# Patient Record
Sex: FEMALE | Race: OTHER | NOT HISPANIC OR LATINO
[De-identification: names, ages, dates, MRNs, and addresses within clinical notes are randomized per-mention and may not be internally consistent; named-entity substitution may affect disease eponyms.]

---

## 2022-08-16 ENCOUNTER — NON-APPOINTMENT (OUTPATIENT)
Age: 27
End: 2022-08-16

## 2023-04-17 ENCOUNTER — EMERGENCY (EMERGENCY)
Facility: HOSPITAL | Age: 28
LOS: 1 days | Discharge: HOME | End: 2023-04-17
Attending: EMERGENCY MEDICINE
Payer: COMMERCIAL

## 2023-04-17 VITALS
RESPIRATION RATE: 18 BRPM | OXYGEN SATURATION: 98 % | DIASTOLIC BLOOD PRESSURE: 89 MMHG | TEMPERATURE: 98 F | SYSTOLIC BLOOD PRESSURE: 138 MMHG | HEART RATE: 71 BPM

## 2023-04-17 VITALS
WEIGHT: 184.97 LBS | HEIGHT: 68 IN | TEMPERATURE: 98 F | HEART RATE: 66 BPM | RESPIRATION RATE: 20 BRPM | OXYGEN SATURATION: 98 % | DIASTOLIC BLOOD PRESSURE: 93 MMHG | SYSTOLIC BLOOD PRESSURE: 134 MMHG

## 2023-04-17 LAB
ALBUMIN SERPL ELPH-MCNC: 3.3 G/DL — SIGNIFICANT CHANGE UP (ref 3.3–5)
ALBUMIN SERPL ELPH-MCNC: 4.7 G/DL — SIGNIFICANT CHANGE UP (ref 3.3–5)
ALP SERPL-CCNC: 17 U/L — LOW (ref 40–120)
ALP SERPL-CCNC: <15 U/L — LOW (ref 40–120)
ALT FLD-CCNC: 15 U/L — SIGNIFICANT CHANGE UP (ref 10–45)
ALT FLD-CCNC: 46 U/L — HIGH (ref 10–45)
ANION GAP SERPL CALC-SCNC: 10 MMOL/L — SIGNIFICANT CHANGE UP (ref 5–17)
ANION GAP SERPL CALC-SCNC: 12 MMOL/L — SIGNIFICANT CHANGE UP (ref 5–17)
ANION GAP SERPL CALC-SCNC: 6 MMOL/L — SIGNIFICANT CHANGE UP (ref 5–17)
APTT BLD: 24.9 SEC — LOW (ref 27.5–35.5)
AST SERPL-CCNC: 53 U/L — HIGH (ref 10–40)
AST SERPL-CCNC: 54 U/L — HIGH (ref 10–40)
BASOPHILS # BLD AUTO: 0.08 K/UL — SIGNIFICANT CHANGE UP (ref 0–0.2)
BASOPHILS NFR BLD AUTO: 1.7 % — SIGNIFICANT CHANGE UP (ref 0–2)
BILIRUB SERPL-MCNC: 0.4 MG/DL — SIGNIFICANT CHANGE UP (ref 0.2–1.2)
BILIRUB SERPL-MCNC: 0.6 MG/DL — SIGNIFICANT CHANGE UP (ref 0.2–1.2)
BUN SERPL-MCNC: 7 MG/DL — SIGNIFICANT CHANGE UP (ref 7–23)
BUN SERPL-MCNC: 8 MG/DL — SIGNIFICANT CHANGE UP (ref 7–23)
BUN SERPL-MCNC: 8 MG/DL — SIGNIFICANT CHANGE UP (ref 7–23)
CALCIUM SERPL-MCNC: 7 MG/DL — LOW (ref 8.4–10.5)
CALCIUM SERPL-MCNC: 9.3 MG/DL — SIGNIFICANT CHANGE UP (ref 8.4–10.5)
CALCIUM SERPL-MCNC: 9.5 MG/DL — SIGNIFICANT CHANGE UP (ref 8.4–10.5)
CHLORIDE SERPL-SCNC: 105 MMOL/L — SIGNIFICANT CHANGE UP (ref 96–108)
CHLORIDE SERPL-SCNC: 105 MMOL/L — SIGNIFICANT CHANGE UP (ref 96–108)
CHLORIDE SERPL-SCNC: 114 MMOL/L — HIGH (ref 96–108)
CO2 SERPL-SCNC: 17 MMOL/L — LOW (ref 22–31)
CO2 SERPL-SCNC: 21 MMOL/L — LOW (ref 22–31)
CO2 SERPL-SCNC: 24 MMOL/L — SIGNIFICANT CHANGE UP (ref 22–31)
CREAT SERPL-MCNC: 0.59 MG/DL — SIGNIFICANT CHANGE UP (ref 0.5–1.3)
CREAT SERPL-MCNC: 0.82 MG/DL — SIGNIFICANT CHANGE UP (ref 0.5–1.3)
CREAT SERPL-MCNC: 0.84 MG/DL — SIGNIFICANT CHANGE UP (ref 0.5–1.3)
EGFR: 100 ML/MIN/1.73M2 — SIGNIFICANT CHANGE UP
EGFR: 126 ML/MIN/1.73M2 — SIGNIFICANT CHANGE UP
EGFR: 97 ML/MIN/1.73M2 — SIGNIFICANT CHANGE UP
EOSINOPHIL # BLD AUTO: 0.2 K/UL — SIGNIFICANT CHANGE UP (ref 0–0.5)
EOSINOPHIL NFR BLD AUTO: 4.4 % — SIGNIFICANT CHANGE UP (ref 0–6)
GLUCOSE SERPL-MCNC: 83 MG/DL — SIGNIFICANT CHANGE UP (ref 70–99)
GLUCOSE SERPL-MCNC: 84 MG/DL — SIGNIFICANT CHANGE UP (ref 70–99)
GLUCOSE SERPL-MCNC: 87 MG/DL — SIGNIFICANT CHANGE UP (ref 70–99)
HCG SERPL-ACNC: <2 MIU/ML — SIGNIFICANT CHANGE UP
HCT VFR BLD CALC: 39.8 % — SIGNIFICANT CHANGE UP (ref 34.5–45)
HGB BLD-MCNC: 13 G/DL — SIGNIFICANT CHANGE UP (ref 11.5–15.5)
HIV 1 & 2 AB SERPL IA.RAPID: SIGNIFICANT CHANGE UP
INR BLD: 1.06 RATIO — SIGNIFICANT CHANGE UP (ref 0.88–1.16)
LYMPHOCYTES # BLD AUTO: 1.32 K/UL — SIGNIFICANT CHANGE UP (ref 1–3.3)
LYMPHOCYTES # BLD AUTO: 29.6 % — SIGNIFICANT CHANGE UP (ref 13–44)
MAGNESIUM SERPL-MCNC: 2.1 MG/DL — SIGNIFICANT CHANGE UP (ref 1.6–2.6)
MANUAL SMEAR VERIFICATION: SIGNIFICANT CHANGE UP
MCHC RBC-ENTMCNC: 30 PG — SIGNIFICANT CHANGE UP (ref 27–34)
MCHC RBC-ENTMCNC: 32.7 GM/DL — SIGNIFICANT CHANGE UP (ref 32–36)
MCV RBC AUTO: 91.7 FL — SIGNIFICANT CHANGE UP (ref 80–100)
MONOCYTES # BLD AUTO: 0.08 K/UL — SIGNIFICANT CHANGE UP (ref 0–0.9)
MONOCYTES NFR BLD AUTO: 1.7 % — LOW (ref 2–14)
NEUTROPHILS # BLD AUTO: 2.68 K/UL — SIGNIFICANT CHANGE UP (ref 1.8–7.4)
NEUTROPHILS NFR BLD AUTO: 59.1 % — SIGNIFICANT CHANGE UP (ref 43–77)
NEUTS BAND # BLD: 0.9 % — SIGNIFICANT CHANGE UP (ref 0–8)
OVALOCYTES BLD QL SMEAR: SLIGHT — SIGNIFICANT CHANGE UP
PHOSPHATE SERPL-MCNC: 4.2 MG/DL — SIGNIFICANT CHANGE UP (ref 2.5–4.5)
PLAT MORPH BLD: NORMAL — SIGNIFICANT CHANGE UP
PLATELET # BLD AUTO: 179 K/UL — SIGNIFICANT CHANGE UP (ref 150–400)
POIKILOCYTOSIS BLD QL AUTO: SLIGHT — SIGNIFICANT CHANGE UP
POLYCHROMASIA BLD QL SMEAR: SLIGHT — SIGNIFICANT CHANGE UP
POTASSIUM SERPL-MCNC: 3.5 MMOL/L — SIGNIFICANT CHANGE UP (ref 3.5–5.3)
POTASSIUM SERPL-MCNC: SIGNIFICANT CHANGE UP MMOL/L (ref 3.5–5.3)
POTASSIUM SERPL-MCNC: SIGNIFICANT CHANGE UP MMOL/L (ref 3.5–5.3)
POTASSIUM SERPL-SCNC: 3.5 MMOL/L — SIGNIFICANT CHANGE UP (ref 3.5–5.3)
POTASSIUM SERPL-SCNC: SIGNIFICANT CHANGE UP MMOL/L (ref 3.5–5.3)
POTASSIUM SERPL-SCNC: SIGNIFICANT CHANGE UP MMOL/L (ref 3.5–5.3)
PROT SERPL-MCNC: 5.6 G/DL — LOW (ref 6–8.3)
PROT SERPL-MCNC: 8.1 G/DL — SIGNIFICANT CHANGE UP (ref 6–8.3)
PROTHROM AB SERPL-ACNC: 12.3 SEC — SIGNIFICANT CHANGE UP (ref 10.5–13.4)
RBC # BLD: 4.34 M/UL — SIGNIFICANT CHANGE UP (ref 3.8–5.2)
RBC # FLD: 14 % — SIGNIFICANT CHANGE UP (ref 10.3–14.5)
RBC BLD AUTO: ABNORMAL
SODIUM SERPL-SCNC: 136 MMOL/L — SIGNIFICANT CHANGE UP (ref 135–145)
SODIUM SERPL-SCNC: 137 MMOL/L — SIGNIFICANT CHANGE UP (ref 135–145)
SODIUM SERPL-SCNC: 141 MMOL/L — SIGNIFICANT CHANGE UP (ref 135–145)
T PALLIDUM AB TITR SER: NEGATIVE — SIGNIFICANT CHANGE UP
VARIANT LYMPHS # BLD: 2.6 % — SIGNIFICANT CHANGE UP (ref 0–6)
WBC # BLD: 4.47 K/UL — SIGNIFICANT CHANGE UP (ref 3.8–10.5)
WBC # FLD AUTO: 4.47 K/UL — SIGNIFICANT CHANGE UP (ref 3.8–10.5)

## 2023-04-17 PROCEDURE — 80053 COMPREHEN METABOLIC PANEL: CPT

## 2023-04-17 PROCEDURE — 84702 CHORIONIC GONADOTROPIN TEST: CPT

## 2023-04-17 PROCEDURE — 84100 ASSAY OF PHOSPHORUS: CPT

## 2023-04-17 PROCEDURE — 99285 EMERGENCY DEPT VISIT HI MDM: CPT

## 2023-04-17 PROCEDURE — 84478 ASSAY OF TRIGLYCERIDES: CPT

## 2023-04-17 PROCEDURE — 36415 COLL VENOUS BLD VENIPUNCTURE: CPT

## 2023-04-17 PROCEDURE — 85025 COMPLETE CBC W/AUTO DIFF WBC: CPT

## 2023-04-17 PROCEDURE — 86703 HIV-1/HIV-2 1 RESULT ANTBDY: CPT

## 2023-04-17 PROCEDURE — 83735 ASSAY OF MAGNESIUM: CPT

## 2023-04-17 PROCEDURE — 85610 PROTHROMBIN TIME: CPT

## 2023-04-17 PROCEDURE — 80048 BASIC METABOLIC PNL TOTAL CA: CPT

## 2023-04-17 PROCEDURE — 99284 EMERGENCY DEPT VISIT MOD MDM: CPT

## 2023-04-17 PROCEDURE — 86780 TREPONEMA PALLIDUM: CPT

## 2023-04-17 PROCEDURE — 85730 THROMBOPLASTIN TIME PARTIAL: CPT

## 2023-04-17 NOTE — ED PROVIDER NOTE - ATTENDING CONTRIBUTION TO CARE
Attending MD Figueroa:  I personally have seen and examined this patient. I have performed a substantive portion of the visit including all aspects of the medical decision making.  Resident note reviewed and agree on plan of care and except where noted.      27-year-old woman with no medical history presenting for evaluation of intermittent bilateral lower extremity numbness and tingling for months however now persistent for the last few days.  She does state that she has had low back pain in the past but none currently.  No fevers chills no night sweats no weight loss.  Denies any weakness in the lower extremities.  No urinary symptoms.  No bowel or bladder dysfunction.    Vital signs are nonactionable.  Patient sitting in stretcher in no apparent distress, well-appearing.  No midline spinal tenderness to palpation. 5/5 strength in bilateral lower extremities, sensation grossly intact to light touch throughout bilateral lower extremities 2+ patellar and Achilles reflexes bilaterally.  Awake and alert. Symmetric eyebrow raise, symmetric eyelid closure. PERRL b/l, EOMI b/l, symmetric smile, tongue midline.  5/5  strength bilaterally, 5/5 elbow flexion bilaterally, 5/5 elbow extension bilaterally, 5/5 shoulder shrug b/l.  5/5 plantar and dorsiflexion b/l, 5/5 knee flexion and extension b/l, 5/5 hip flexion and extension b/l. Sensation intact and symmetric grossly to light touch throughout face and bilateral upper and lower extremities,  Finger to nose normal bilaterally. Steady gait.  No midline spinal tenderness    Patient with bilateral lower extremity numbness and tingling.  Patient's examination is notable for no objective sensory deficits of the lower extremities, reflexes are full and equal in the bilateral lower extremities.  Guillain-Barré syndrome seems unlikely in this patient given preserved reflexes.  Compressive lumbar spinal lesion seems also less likely given preserved motor strength in the lower extremities.  Consider transverse myelitis versus electrolyte derangement.  Will obtain screening labs and neurology consultation to determine next diagnostic step for this patient.          *The above represents an initial assessment/impression. Please refer to progress notes for potential changes in patient clinical course*

## 2023-04-17 NOTE — ED ADULT NURSE NOTE - CHIEF COMPLAINT QUOTE
numbness & tingling to BLE since yesterday. she stated its been happening months ago. No recent injury to back or head. Alert & oriented x4, ambulatory.

## 2023-04-17 NOTE — ED PROVIDER NOTE - NSFOLLOWUPCLINICS_GEN_ALL_ED_FT
[facility]  Neurology  [address] [place] [address]  Phone: [phone]  Fax: [phone]  Follow Up Time: 1-3 Days

## 2023-04-17 NOTE — ED PROVIDER NOTE - PHYSICAL EXAMINATION
GENERAL: Awake, alert, NAD  HEENT: NC/AT, moist mucous membranes, PERRL, EOMI  LUNGS: CTAB, no wheezes or crackles   CARDIAC: RRR, no m/r/g  ABDOMEN: Soft, , non tender, non distended, no rebound, no guarding  BACK: No midline spinal tenderness, no CVA tenderness  EXT: No edema, no calf tenderness, 2+ DP pulses bilaterally, no deformities.  NEURO: alert, CN 2-12 intact,, subjective decreased sensation throughout lower extremities w/ appropriate proprioception of distal foot, coordination shows no abnormalities , motor 5/5 RUE/LUE/RLE/LLE/EHL/Plantar flexion, , gait nl   SKIN: Warm and dry. No rash.  PSYCH: Normal affect.

## 2023-04-17 NOTE — ED ADULT NURSE REASSESSMENT NOTE - NS ED NURSE REASSESS COMMENT FT1
Report received from alla BARNES. Introduced self to pt updated in plan of care awaiting neuro recommendation and repeat lab work. Stretcher in lowest position and locked, appropriate side rails in place, room cleared of clutter and safety hazards, blankets given for comfort

## 2023-04-17 NOTE — ED PROVIDER NOTE - OBJECTIVE STATEMENT
27-year-old female without any significant past medical conditions chief complaint of bilateral leg numbness/paresthesias.  Patient notes symptoms started 3 days ago constant in nature.  Patient has noted to have the symptoms similarly a few months ago but ultimately resolved after 30 minutes.  Patient states that she is having full strength but it feels like something she might be weak in her thighs.  Patient denies any recent vaccines any sicknesses upper respiratory viral infections.  No trauma to the lower extremities or back.

## 2023-04-17 NOTE — ED PROVIDER NOTE - PATIENT PORTAL LINK FT
You can access the FollowMyHealth Patient Portal offered by [facility] by registering at the following website: [email or web address]. By joining [employer]’s FollowMyHealth portal, you will also be able to view your health information using other applications (apps) compatible with our system.

## 2023-04-17 NOTE — CONSULT NOTE ADULT - ASSESSMENT
27y (1995) woman with no PMHx presented to the ED for b/l LE numbness. Pt stated that her b/l LE numbness started first week of Jan 2023 and has been intermittent except starting yesterday became constant. Described as light intensity pins and needles sensation, not associated with back pain, incontinence, weakness, HA, vision or speech changes, no issues with gait or coordination. No recent medications, drug or alcohol use. Denied anxiety or recent stressors.     Impression: Subjective numbness of unclear etiology and likely 2/2 peripheral etiology     Recommendations:   No further inpatient neurological testing at this time  Patient can follow up with general neurology at [address] [place] [address] 1-2 weeks after discharge. Please instruct the patient to call [phone] to schedule this appointment.     Rest of care per primary team    Case d/w neurology attending Dr. Bansal

## 2023-04-17 NOTE — ED PROVIDER NOTE - PROGRESS NOTE DETAILS
[facility] PGY 2 Neurology aware Brian BOBBY: Pt is stable and ready for discharge. Strict return precautions given. All questions answered. Pt states that her symptoms have resolved and is wishing to go home.

## 2023-04-17 NOTE — ED ADULT TRIAGE NOTE - CHIEF COMPLAINT QUOTE
numbness & tingling to BLE since yesterday. room air numbness & tingling to BLE since yesterday. she stated its been happening months ago. No recent injury to back or head. Alert & oriented x4, ambulatory.

## 2023-04-17 NOTE — CONSULT NOTE ADULT - SUBJECTIVE AND OBJECTIVE BOX
Neurology - Consult Note    -  Spectra: [phone] ([facility]), [phone] ([facility])    HPI: Patient OLIVIA FAUST is a 27y (1995) woman with no PMHx presented to the ED for b/l LE numbness. Pt stated that her b/l LE numbness started first week of Jan 2023  and has been intermittent except starting yesterday became constant. Described as light intensity pins and needles sensation, not associated with back pain, incontinence, weakness, HA, vision or speech changes, no issues with gait or coordination. No recent medications, drug or alcohol use. Denied anxiety or recent stressors.   No prior similar episodes. No exacerbating or relieving symptoms    Review of Systems:    CONSTITUTIONAL: No fevers or chills  EYES AND ENT: No visual changes or no throat pain   NECK: No pain or stiffness  RESPIRATORY: No hemoptysis or shortness of breath  CARDIOVASCULAR: No chest pain or palpitations  GASTROINTESTINAL: No melena or hematochezia  GENITOURINARY: No dysuria or hematuria  NEUROLOGICAL: +As stated in HPI above  SKIN: No itching, burning, rashes, or lesions   All other review of systems is negative unless indicated above.    Allergies:  No Known Allergies      PMHx/PSHx/Family Hx: As above, otherwise see below       Social Hx:  No current use of tobacco, alcohol, or illicit drugs     Medications:  MEDICATIONS  (STANDING):    MEDICATIONS  (PRN):        Home Medications:      Vitals:  T(C): 37 (04-17-23 @ 17:22), Max: 37 (04-17-23 @ 17:22)  HR: 66 (04-17-23 @ 17:22) (66 - 66)  BP: 148/85 (04-17-23 @ 17:22) (134/93 - 148/85)  RR: 16 (04-17-23 @ 17:22) (16 - 20)  SpO2: 99% (04-17-23 @ 17:22) (98% - 99%)    Physical Examination:    General - NAD  Cardiovascular - Peripheral pulses palpable, no edema    Neurologic Exam:  Mental status - Awake, Alert, Oriented to person, place, and time. Speech fluent, repetition and naming intact. Follows simple and complex commands.     Cranial nerves - PERRL, VFF, EOMI, face sensation (V1-V3) intact LT, No facial asymmetry b/l, hearing grossly intact b/l, trapezius 5/5 strength b/l, tongue midline on protrusion     Motor - Normal bulk and tone throughout. No pronator drift.  Strength testing            Deltoid      Biceps      Triceps     Wrist Extension    Wrist Flexion       R            5                 5               5                     5                              5                        5  L             5                 5               5                     5                              5                       5              Hip Flexion    Hip Extension    Knee Flexion    Knee Extension    Dorsiflexion    Plantar Flexion  R              5                           5                       5                           5                            5                          5  L              5                           5                        5                           5                            5                          5    Sensation - pin prick. Light touch/temperature intact throughout    DTR's -             Biceps      Triceps     Brachioradialis      Patellar    Ankle    Toes/plantar response  R             2+             2+                  2+                       2+            2+                 Down  L              2+             2+                 2+                        2+           2+                 Down    Coordination - Finger to Nose intact b/l. No tremors appreciated    Gait and station - Normal casual gait. Romberg (-)    Labs:                        13.0   4.47  )-----------( 179      ( 17 Apr 2023 16:11 )             39.8     04-17    137  |  114<H>  |  7   ----------------------------<  84  see note   |  17<L>  |  0.59    Ca    7.0<L>      17 Apr 2023 17:34  Phos  4.2     04-17  Mg     2.1     04-17    TPro  5.6<L>  /  Alb  3.3  /  TBili  0.4  /  DBili  x   /  AST  54<H>  /  ALT  15  /  AlkPhos  <15<L>  04-17    CAPILLARY BLOOD GLUCOSE        LIVER FUNCTIONS - ( 17 Apr 2023 17:34 )  Alb: 3.3 g/dL / Pro: 5.6 g/dL / ALK PHOS: <15 U/L / ALT: 15 U/L / AST: 54 U/L / GGT: x             PT/INR - ( 17 Apr 2023 16:11 )   PT: 12.3 sec;   INR: 1.06 ratio         PTT - ( 17 Apr 2023 16:11 )  PTT:24.9 sec           Radiology:

## 2023-04-17 NOTE — ED ADULT NURSE NOTE - OBJECTIVE STATEMENT
Patient c/o numbness and tingling in bilateral lower extremities constant since yesterday morning. Patient describes it as "pins and needles from thigh to heel" of both legs. States that this happened a few months ago but it quickly resolved that time. Denies injury to area, sob, chest pain, headache, blurred vision, fever, chills, n/v/d. Patient states that she is able to walk without difficulty but reports feeling weak. Patient A&Ox4. Skin warm and intact. Plan of care in progress.

## 2023-04-17 NOTE — ED PROVIDER NOTE - NSFOLLOWUPINSTRUCTIONS_ED_ALL_ED_FT
The results of any blood tests and imaging studies completed during your visit today were discussed and explained to you and a copy provided with your discharge instructions. Please follow up with your primary care doctor within 48 hours.     You were seen for numbness in lower extremities    SEEK IMMEDIATE MEDICAL CARE IF YOU HAVE ANY OF THE FOLLOWING SYMPTOMS: worsening fever, red streaks coming from affected area, vomiting or diarrhea, or dizziness/lightheadedness.     Please follow up with general neurology at [address] [place] [address] in 1 week. Call [phone] to schedule this appointment.

## 2023-04-17 NOTE — ED PROVIDER NOTE - CLINICAL SUMMARY MEDICAL DECISION MAKING FREE TEXT BOX
27-year-old female without any significant past medical conditions chief complaint of bilateral paresthesias.  Unlikely for GBS story consideration for transverse myelitis but less likely givenother systemic symptoms and full strength.  Given complexity of case/peculiarity will have neurology assess patient as well.

## 2023-04-18 LAB — TRIGL SERPL-MCNC: 57 MG/DL — SIGNIFICANT CHANGE UP

## 2023-04-19 ENCOUNTER — APPOINTMENT (OUTPATIENT)
Dept: NEUROLOGY | Facility: CLINIC | Age: 28
End: 2023-04-19
Payer: COMMERCIAL

## 2023-04-19 DIAGNOSIS — Z78.9 OTHER SPECIFIED HEALTH STATUS: ICD-10-CM

## 2023-04-19 PROCEDURE — 99205 OFFICE O/P NEW HI 60 MIN: CPT

## 2023-04-19 RX ORDER — GABAPENTIN 100 MG/1
100 CAPSULE ORAL
Qty: 90 | Refills: 3 | Status: ACTIVE | COMMUNITY
Start: 2023-04-19 | End: 1900-01-01

## 2023-04-19 NOTE — HISTORY OF PRESENT ILLNESS
[FreeTextEntry1] : 27-year-old woman who is here for initial consultation of numbness and tingling along the thighs to her feet.  Patient states that it was intermittent in January and starting this week it became more constant.  Patient states that sometimes it extends to her heels but sometimes it also involves the toes.  Patient denies any triggers such as trauma or medication changes.  Patient denies any urinary or fecal incontinence and there is no perineal anesthesia.  Patient denies any sexual dysfunction.  Patient does have a history of back pain for the past 9 years after the birth of her child however straight leg raise was negative bilaterally on exam today.  Patient denies any diarrhea or fevers or URI symptoms. Patient does not have any history of other neurologic symptoms.

## 2023-04-19 NOTE — PHYSICAL EXAM
[General Appearance - Alert] : alert [Oriented To Time, Place, And Person] : oriented to person, place, and time [Person] : oriented to person [Place] : oriented to place [Time] : oriented to time [Short Term Intact] : short term memory intact [Fluency] : fluency intact [Current Events] : adequate knowledge of current events [Cranial Nerves Optic (II)] : visual acuity intact bilaterally,  visual fields full to confrontation, pupils equal round and reactive to light [Cranial Nerves Oculomotor (III)] : extraocular motion intact [Cranial Nerves Vestibulocochlear (VIII)] : hearing was intact bilaterally [Cranial Nerves Accessory (XI - Cranial And Spinal)] : head turning and shoulder shrug symmetric [Motor Tone] : muscle tone was normal in all four extremities [Motor Strength] : muscle strength was normal in all four extremities [Sensation Pain / Temperature Decrease] : pain and temperature was intact [Sensation Tactile Decrease] : light touch was intact [Sensation Vibration Decrease] : vibration was intact [Proprioception] : proprioception was intact [Romberg's Sign] : Romberg's sign was negtive [Abnormal Walk] : normal gait [Coordination - Dysmetria Impaired Finger-to-Nose Bilateral] : not present [1+] : Patella left 1+ [FreeTextEntry8] : Able to toe and heel walk without any difficulties.  Negative straight leg raise.

## 2023-04-19 NOTE — DISCUSSION/SUMMARY
[FreeTextEntry1] : 27-year-old woman who is here for initial consultation of bilateral thigh numbness and tingling to her feet.  The symptoms can be localized to the lumbar spine.  We will check an MRI of the lumbar spine with and without contrast to evaluate for any signs of inflammation or demyelination.  We will try gabapentin with a slow increase to see if that will help with her paresthesias.  Patient will follow-up with me after the studies are completed.\par \par I spent the time noted on the day of this patient encounter preparing for, providing and documenting the above E/M service and counseling and educate patient on differential, workup, disease course, and treatment/management. Education was provided to the patient during this encounter. All questions and concerns were answered and addressed in detail. The patient verbalized understanding and agreed to plan. Patient was advised to continue to monitor for neurologic symptoms and to notify my office or go to the nearest emergency room if there are any changes. Any orders/referrals and communications were provided as well. \par Side effects of the above medications were discussed in detail including but not limited to applicable black box warning and teratogenicity as appropriate. \par Patient was advised to bring previous records to my office. \par \par \par

## 2023-04-21 ENCOUNTER — APPOINTMENT (OUTPATIENT)
Dept: MRI IMAGING | Facility: CLINIC | Age: 28
End: 2023-04-21
Payer: COMMERCIAL

## 2023-04-21 PROCEDURE — A9585: CPT

## 2023-04-21 PROCEDURE — 72149 MRI LUMBAR SPINE W/DYE: CPT

## 2023-04-27 ENCOUNTER — NON-APPOINTMENT (OUTPATIENT)
Age: 28
End: 2023-04-27

## 2023-05-04 ENCOUNTER — TRANSCRIPTION ENCOUNTER (OUTPATIENT)
Age: 28
End: 2023-05-04

## 2023-05-04 ENCOUNTER — APPOINTMENT (OUTPATIENT)
Dept: NEUROLOGY | Facility: CLINIC | Age: 28
End: 2023-05-04
Payer: COMMERCIAL

## 2023-05-04 VITALS
DIASTOLIC BLOOD PRESSURE: 94 MMHG | SYSTOLIC BLOOD PRESSURE: 148 MMHG | HEIGHT: 68 IN | WEIGHT: 183 LBS | HEART RATE: 58 BPM | BODY MASS INDEX: 27.74 KG/M2

## 2023-05-04 PROCEDURE — 99215 OFFICE O/P EST HI 40 MIN: CPT

## 2023-05-04 NOTE — PHYSICAL EXAM
[General Appearance - Alert] : alert [Oriented To Time, Place, And Person] : oriented to person, place, and time [Person] : oriented to person [Place] : oriented to place [Time] : oriented to time [Short Term Intact] : short term memory intact [Fluency] : fluency intact [Current Events] : adequate knowledge of current events [Cranial Nerves Optic (II)] : visual acuity intact bilaterally,  visual fields full to confrontation, pupils equal round and reactive to light [Cranial Nerves Oculomotor (III)] : extraocular motion intact [Cranial Nerves Vestibulocochlear (VIII)] : hearing was intact bilaterally [Cranial Nerves Accessory (XI - Cranial And Spinal)] : head turning and shoulder shrug symmetric [Motor Tone] : muscle tone was normal in all four extremities [Motor Strength] : muscle strength was normal in all four extremities [Sensation Tactile Decrease] : light touch was intact [Sensation Pain / Temperature Decrease] : pain and temperature was intact [Sensation Vibration Decrease] : vibration was intact [Proprioception] : proprioception was intact [Romberg's Sign] : Romberg's sign was negtive [Abnormal Walk] : normal gait [Coordination - Dysmetria Impaired Finger-to-Nose Bilateral] : not present [1+] : Patella left 1+ [FreeTextEntry8] : Able to toe and heel walk without any difficulties.  Negative straight leg raise.

## 2023-05-04 NOTE — DISCUSSION/SUMMARY
[FreeTextEntry1] : 19-year-old woman who is here for follow-up of bilateral thigh numbness and tingling to her feet.  Patient's MRI of the lumbar spine with and without contrast was unremarkable.  Patient was offered but never took the gabapentin.  Given her family history of osteoid osteoma that was missed on MRI we will check CT of the lumbar spine to look for any lesions.  To my knowledge the osteoid osteoma does not have a genetic basis.  Patient will also return for nerve conduction EMG studies to evaluate for any large fiber neuropathy.\par \par I spent the time noted on the day of this patient encounter preparing for, providing and documenting the above E/M service and counseling and educate patient on differential, workup, disease course, and treatment/management. Education was provided to the patient during this encounter. All questions and concerns were answered and addressed in detail. The patient verbalized understanding and agreed to plan. Patient was advised to continue to monitor for neurologic symptoms and to notify my office or go to the nearest emergency room if there are any changes. Any orders/referrals and communications were provided as well. \par Side effects of the above medications were discussed in detail including but not limited to applicable black box warning and teratogenicity as appropriate. \par Patient was advised to bring previous records to my office. \par \par \par

## 2023-05-04 NOTE — HISTORY OF PRESENT ILLNESS
[FreeTextEntry1] : 27-year-old woman who is here for initial consultation of numbness and tingling along the thighs to her feet.  Patient states that it was intermittent in January and starting this week it became more constant.  Patient states that sometimes it extends to her heels but sometimes it also involves the toes.  Patient denies any triggers such as trauma or medication changes.  Patient denies any urinary or fecal incontinence and there is no perineal anesthesia.  Patient denies any sexual dysfunction.  Patient does have a history of back pain for the past 9 years after the birth of her child however straight leg raise was negative bilaterally on exam today.  Patient denies any diarrhea or fevers or URI symptoms. Patient does not have any history of other neurologic symptoms.\par \par Interval history May 4, 2023: Patient had MRI of the lumbar spine with and without contrast which was negative for any demyelinating lesions or radiculopathy.  Patient was prescribed gabapentin but she never took it.  Patient states that the tingling is still there from her anterior thighs to her feet.  Patient states that she experiences it more so at night.  Mother reveals that her brother at the age of 15 had similar symptoms on the right leg with spine curvature and he was noted to have an osteoid osteoma.  Mother states that they were not able to detect this lesion on MRIs but it was found on CT scans.

## 2023-05-06 ENCOUNTER — APPOINTMENT (OUTPATIENT)
Dept: CT IMAGING | Facility: CLINIC | Age: 28
End: 2023-05-06
Payer: COMMERCIAL

## 2023-05-06 ENCOUNTER — OUTPATIENT (OUTPATIENT)
Dept: OUTPATIENT SERVICES | Facility: HOSPITAL | Age: 28
LOS: 1 days | End: 2023-05-06
Payer: COMMERCIAL

## 2023-05-06 DIAGNOSIS — R20.2 PARESTHESIA OF SKIN: ICD-10-CM

## 2023-05-06 PROCEDURE — 72131 CT LUMBAR SPINE W/O DYE: CPT | Mod: 26

## 2023-05-06 PROCEDURE — 72131 CT LUMBAR SPINE W/O DYE: CPT

## 2023-05-11 ENCOUNTER — NON-APPOINTMENT (OUTPATIENT)
Age: 28
End: 2023-05-11

## 2023-05-23 ENCOUNTER — APPOINTMENT (OUTPATIENT)
Dept: NEUROLOGY | Facility: CLINIC | Age: 28
End: 2023-05-23
Payer: COMMERCIAL

## 2023-05-23 PROCEDURE — 99215 OFFICE O/P EST HI 40 MIN: CPT | Mod: 95

## 2023-05-23 NOTE — DISCUSSION/SUMMARY
[FreeTextEntry1] : 27-year-old woman who is here for follow-up of bilateral thigh numbness and tingling to her feet.  Patient's MRI of the lumbar spine with and without contrast along with CT of the lumbar spine was unremarkable.  Will proceed with MRI of the thoracic spine with and without contrast along with MRI of the brain to evaluate for any signs of demyelination as a cause of her symptoms.  Patient will follow-up with me after the studies are completed.\par \par physician location: office\par patient location: home\par \par I spent 40 minutes of total time, during which more than 50% of the time was spent on counseling. I explained the diagnosis, other possible diagnoses, workup, and management, as well as answered any questions.\par \par This is a telemedicine visit that was performed using real time 2-way audiovisual technology. Verbal consent to participate in video visit was obtained and patient was aware of their right to refuse to participate in services delivered via telemedicine and the alternative and potential limitations of participating in a telemedicine visit vs a face-to-face visit; I have also informed the patient of my current location in the [facility] and the names of all persons participating in the telemedicine service and their role in the encounter. The patient agrees to have this service via Telehealth.\par \par  This visit occurred during the Coronavirus (COVID-19) Public Health Emergency. I discussed with the patient the nature of our telemedicine visits, that: \par • I would evaluate the patient and recommend diagnostics and treatments based on my assessment \par • Our sessions are not being recorded and that personal health information is protected \par • Our team would provide follow up care in person if/when the patient needs it.\par \par \par

## 2023-05-23 NOTE — PHYSICAL EXAM
[Person] : oriented to person [Place] : oriented to place [Time] : oriented to time [Short Term Intact] : short term memory intact [Fluency] : fluency intact [Current Events] : adequate knowledge of current events [Cranial Nerves Oculomotor (III)] : extraocular motion intact [Cranial Nerves Facial (VII)] : face symmetrical [Cranial Nerves Vestibulocochlear (VIII)] : hearing was intact bilaterally

## 2023-05-23 NOTE — HISTORY OF PRESENT ILLNESS
[FreeTextEntry1] : verbal consent given on 05/23/2023 and 10:39  by Sheron Anton at [address] [place] [id number]\par \par \par 27-year-old woman who is here for initial consultation of numbness and tingling along the thighs to her feet.  Patient states that it was intermittent in January and starting this week it became more constant.  Patient states that sometimes it extends to her heels but sometimes it also involves the toes.  Patient denies any triggers such as trauma or medication changes.  Patient denies any urinary or fecal incontinence and there is no perineal anesthesia.  Patient denies any sexual dysfunction.  Patient does have a history of back pain for the past 9 years after the birth of her child however straight leg raise was negative bilaterally on exam today.  Patient denies any diarrhea or fevers or URI symptoms. Patient does not have any history of other neurologic symptoms.\par \par Interval history May 4, 2023: Patient had MRI of the lumbar spine with and without contrast which was negative for any demyelinating lesions or radiculopathy.  Patient was prescribed gabapentin but she never took it.  Patient states that the tingling is still there from her anterior thighs to her feet.  Patient states that she experiences it more so at night.  Mother reveals that her brother at the age of 15 had similar symptoms on the right leg with spine curvature and he was noted to have an osteoid osteoma.  Mother states that they were not able to detect this lesion on MRIs but it was found on CT scans.  \par \par Interval history May 23, 2023: Since the last visit patient had a CT of the lumbar spine as per request by mother to not miss the osteoid osteoma.  Patient had no disc herniations or other abnormal findings.  Patient continues to have symptoms in her bilateral thighs with no symptoms in her arms.  Patient wishes to obtain further imaging of the thoracic and head to rule out demyelinating lesion.  Given her age we will proceed with that.\par \par CONSENT FOR VIDEO MEDICAL VISIT1. I understand that my physician wishes me to engage in a telemedicine consultation.2. My physician has explained to me how the video conferencing technology will be used to affect such a consultation will not be the same as a direct patient/health care provider visit due to the fact that I will not be in the same room as my physician 3. I understand there are potential risks to this technology, including interruptions, unauthorized access and technical difficulties. I understand that my health care provider or I can discontinue the telemedicine consult/visit if it is felt that the videoconferencing connections are not adequate for the situation.4. I understand that my healthcare information may be shared with other individuals for scheduling and billing purposes. Others may also be present during the consultation other than my physician and consulting health care provider in order to operate the video equipment. The above mentioned people will all maintain confidentiality of the information obtained. I further understand that I will be informed of their presence in the consultation and thus will have the right to request the following: (1) omit specific details of my medical history/physical examination that are personally sensitive to me; (2) ask non-medical personnel to leave the telemedicine examination room: and or (3) terminate the consultation at any time.5. I have had the alternatives to a telemedicine consultation explained to me, and in choosing to participate in a telemedicine consultation. I understand that some parts of the exam involving physical tests may be conducted by individuals at my location at the direction of the consulting health care provider.6. In an emergent consultation, I understand that the responsibility of the telemedicine consulting specialist is to advise my local practitioner and that the specialist’s responsibility will conclude upon the termination of the video conference connection.7. I understand that billing will occur from both my physician and as a facility fee from the site from which I am presented.8. I have had a direct conversation with my physician, during which I had the opportunity to ask questions in regard to this procedure. My questions have been answered and the risks, benefits and any practical alternatives have been discussed with me in a language in which I understand\par

## 2023-06-06 ENCOUNTER — LABORATORY RESULT (OUTPATIENT)
Age: 28
End: 2023-06-06

## 2023-06-06 ENCOUNTER — APPOINTMENT (OUTPATIENT)
Dept: INTERNAL MEDICINE | Facility: CLINIC | Age: 28
End: 2023-06-06
Payer: COMMERCIAL

## 2023-06-06 VITALS
DIASTOLIC BLOOD PRESSURE: 80 MMHG | OXYGEN SATURATION: 100 % | WEIGHT: 185 LBS | TEMPERATURE: 97.7 F | HEIGHT: 68 IN | SYSTOLIC BLOOD PRESSURE: 130 MMHG | BODY MASS INDEX: 28.04 KG/M2 | HEART RATE: 56 BPM | RESPIRATION RATE: 17 BRPM

## 2023-06-06 VITALS — HEART RATE: 60 BPM

## 2023-06-06 DIAGNOSIS — K58.9 IRRITABLE BOWEL SYNDROME W/OUT DIARRHEA: ICD-10-CM

## 2023-06-06 DIAGNOSIS — Z83.3 FAMILY HISTORY OF DIABETES MELLITUS: ICD-10-CM

## 2023-06-06 DIAGNOSIS — Z80.0 FAMILY HISTORY OF MALIGNANT NEOPLASM OF DIGESTIVE ORGANS: ICD-10-CM

## 2023-06-06 DIAGNOSIS — Z00.00 ENCOUNTER FOR GENERAL ADULT MEDICAL EXAMINATION W/OUT ABNORMAL FINDINGS: ICD-10-CM

## 2023-06-06 PROCEDURE — 99385 PREV VISIT NEW AGE 18-39: CPT

## 2023-06-07 DIAGNOSIS — R82.998 OTHER ABNORMAL FINDINGS IN URINE: ICD-10-CM

## 2023-06-07 DIAGNOSIS — E55.9 VITAMIN D DEFICIENCY, UNSPECIFIED: ICD-10-CM

## 2023-06-07 DIAGNOSIS — R82.90 UNSPECIFIED ABNORMAL FINDINGS IN URINE: ICD-10-CM

## 2023-06-07 LAB
25(OH)D3 SERPL-MCNC: 18.3 NG/ML
ALBUMIN SERPL ELPH-MCNC: 4.4 G/DL
ALP BLD-CCNC: 29 U/L
ALT SERPL-CCNC: 11 U/L
ANION GAP SERPL CALC-SCNC: 10 MMOL/L
APPEARANCE: ABNORMAL
AST SERPL-CCNC: 18 U/L
B BURGDOR AB SER-IMP: NEGATIVE
B BURGDOR IGG+IGM SER QL: 0.18 INDEX
BILIRUB SERPL-MCNC: 0.6 MG/DL
BILIRUBIN URINE: NEGATIVE
BLOOD URINE: NEGATIVE
BUN SERPL-MCNC: 11 MG/DL
CALCIUM SERPL-MCNC: 9.6 MG/DL
CHLORIDE SERPL-SCNC: 106 MMOL/L
CHOLEST SERPL-MCNC: 164 MG/DL
CO2 SERPL-SCNC: 25 MMOL/L
COLOR: YELLOW
CREAT SERPL-MCNC: 0.98 MG/DL
CRP SERPL-MCNC: <3 MG/L
DSDNA AB SER-ACNC: <12 IU/ML
EGFR: 81 ML/MIN/1.73M2
ERYTHROCYTE [SEDIMENTATION RATE] IN BLOOD BY WESTERGREN METHOD: 10 MM/HR
ESTIMATED AVERAGE GLUCOSE: 103 MG/DL
FERRITIN SERPL-MCNC: 21 NG/ML
FOLATE SERPL-MCNC: 9.7 NG/ML
GLUCOSE QUALITATIVE U: NEGATIVE MG/DL
GLUCOSE SERPL-MCNC: 83 MG/DL
HBA1C MFR BLD HPLC: 5.2 %
HDLC SERPL-MCNC: 62 MG/DL
IRON SATN MFR SERPL: 46 %
IRON SERPL-MCNC: 155 UG/DL
KETONES URINE: ABNORMAL MG/DL
LDLC SERPL CALC-MCNC: 88 MG/DL
LEUKOCYTE ESTERASE URINE: ABNORMAL
NITRITE URINE: NEGATIVE
NONHDLC SERPL-MCNC: 102 MG/DL
PH URINE: 6.5
POTASSIUM SERPL-SCNC: 4.1 MMOL/L
PROT SERPL-MCNC: 7 G/DL
PROTEIN URINE: 30 MG/DL
SODIUM SERPL-SCNC: 141 MMOL/L
SPECIFIC GRAVITY URINE: 1.02
TIBC SERPL-MCNC: 342 UG/DL
TRIGL SERPL-MCNC: 70 MG/DL
TSH SERPL-ACNC: 2.05 UIU/ML
UIBC SERPL-MCNC: 186 UG/DL
UROBILINOGEN URINE: 0.2 MG/DL
VIT B12 SERPL-MCNC: 672 PG/ML

## 2023-06-08 ENCOUNTER — LABORATORY RESULT (OUTPATIENT)
Age: 28
End: 2023-06-08

## 2023-06-08 LAB
ANA PAT FLD IF-IMP: ABNORMAL
ANA SER IF-ACNC: ABNORMAL
ENA SS-A AB SER IA-ACNC: <0.2 AL
ENA SS-B AB SER IA-ACNC: <0.2 AL

## 2023-06-13 LAB
APPEARANCE: ABNORMAL
BILIRUBIN URINE: NEGATIVE
BLOOD URINE: NEGATIVE
COLOR: YELLOW
GLUCOSE QUALITATIVE U: NEGATIVE MG/DL
KETONES URINE: ABNORMAL MG/DL
LEUKOCYTE ESTERASE URINE: ABNORMAL
NITRITE URINE: NEGATIVE
PH URINE: 5.5
PROTEIN URINE: NORMAL MG/DL
SPECIFIC GRAVITY URINE: 1.03
UROBILINOGEN URINE: 0.2 MG/DL

## 2023-06-19 ENCOUNTER — APPOINTMENT (OUTPATIENT)
Dept: RHEUMATOLOGY | Facility: CLINIC | Age: 28
End: 2023-06-19
Payer: COMMERCIAL

## 2023-06-19 ENCOUNTER — LABORATORY RESULT (OUTPATIENT)
Age: 28
End: 2023-06-19

## 2023-06-19 VITALS
SYSTOLIC BLOOD PRESSURE: 147 MMHG | DIASTOLIC BLOOD PRESSURE: 52 MMHG | WEIGHT: 183 LBS | HEIGHT: 68 IN | TEMPERATURE: 97.1 F | HEART RATE: 53 BPM | RESPIRATION RATE: 16 BRPM | BODY MASS INDEX: 27.74 KG/M2 | OXYGEN SATURATION: 98 %

## 2023-06-19 DIAGNOSIS — D72.819 DECREASED WHITE BLOOD CELL COUNT, UNSPECIFIED: ICD-10-CM

## 2023-06-19 PROCEDURE — 99204 OFFICE O/P NEW MOD 45 MIN: CPT

## 2023-06-19 NOTE — ASSESSMENT
[FreeTextEntry1] : Positive MYRNA, leukopenia, paresthesia\par no evidence of inflammatory arthropathy or myopathy on exam \par The significance of positive MYRNA was discussed with the patient in great detail. I will check second tier serology as outlined below. Positive MYRNA may be seen in the setting of various  autoimmune diseases including but not limited to SLE, rheumatoid arthritis, Sjogren's syndrome,  autoimmune thyroid disease, scleroderma and others. MYRNA may also be present at healthy individuals.\par neurological work up is ongoing and additional imaging is pending\par \par contact after test results.

## 2023-06-19 NOTE — HISTORY OF PRESENT ILLNESS
[FreeTextEntry1] : Patient presents for initial evaluation.  Patient referred by PCP for positive MYRNA. \par MYRNA was done for the w/u of paresthesias. She developed numbness/tingling in the lower extremities, developed 3 months ago \par symptoms have not changed in the last 3 months; some days are more intense than others.  Intensity is irrespective of the level of activity \par she was seen by neurologist and is awaiting MRI of brain and T spine\par \par no joint pain \par no joint swelling \par no rashes\par no sicca symptoms \par no personal history of diabetes\par no hair loss \par no oral/nasal ulcers\par c/o fatigue, now better with supplements\par no history of thrombosis \par no miscarriages\par \par 1 pregnancy, son 9 years old, no complication \par \par low vitamin D -- started on supplement \par positive MYRNA  \par WBC: leukopenia

## 2023-06-19 NOTE — HISTORY OF PRESENT ILLNESS
[FreeTextEntry1] : Patient presents for initial evaluation.  Patient referred by PCP for positive MYRNA. \par MYRNA was done for the w/u of paresthesias. She developed numbness/tingling in the lower extremities, developed 3 months ago \par symptoms have not changed in the last 3 months; some days are more intense than others.  Intensity is irrespective of the level of activity \par she was seen by neurologist and is awaiting MRI of brain and T spine\par \par no joint pain \par no joint swelling \par no rashes\par no sicca symptoms \par no personal history of diabetes\par no hair loss \par no oral/nasal ulcers\par c/o fatigue, now better with supplements\par no history of thrombosis \par no miscarriages\par \par 1 pregnancy, son 9 years old, no complication \par \par low vitamin D -- started on supplement \par positive YMRNA  \par WBC: leukopenia

## 2023-06-19 NOTE — CONSULT LETTER
[Dear  ___] : Dear  [unfilled], [Consult Letter:] : I had the pleasure of evaluating your patient, [unfilled]. [Please see my note below.] : Please see my note below. [Consult Closing:] : Thank you very much for allowing me to participate in the care of this patient.  If you have any questions, please do not hesitate to contact me. [Sincerely,] : Sincerely, [FreeTextEntry3] : Dr. Cydney Hodgson \par Rheumatology Attending\par [facility]\par

## 2023-06-19 NOTE — CONSULT LETTER
[Dear  ___] : Dear  [unfilled], [Consult Letter:] : I had the pleasure of evaluating your patient, [unfilled]. [Please see my note below.] : Please see my note below. [Consult Closing:] : Thank you very much for allowing me to participate in the care of this patient.  If you have any questions, please do not hesitate to contact me. [Sincerely,] : Sincerely, [FreeTextEntry3] : Dr. Cydnye Hodgson \par Rheumatology Attending\par [facility]\par

## 2023-06-27 ENCOUNTER — TRANSCRIPTION ENCOUNTER (OUTPATIENT)
Age: 28
End: 2023-06-27

## 2023-06-27 ENCOUNTER — APPOINTMENT (OUTPATIENT)
Dept: ULTRASOUND IMAGING | Facility: CLINIC | Age: 28
End: 2023-06-27
Payer: COMMERCIAL

## 2023-06-27 ENCOUNTER — OUTPATIENT (OUTPATIENT)
Dept: OUTPATIENT SERVICES | Facility: HOSPITAL | Age: 28
LOS: 1 days | End: 2023-06-27
Payer: COMMERCIAL

## 2023-06-27 DIAGNOSIS — R82.998 OTHER ABNORMAL FINDINGS IN URINE: ICD-10-CM

## 2023-06-27 DIAGNOSIS — R82.90 UNSPECIFIED ABNORMAL FINDINGS IN URINE: ICD-10-CM

## 2023-06-27 PROCEDURE — 76770 US EXAM ABDO BACK WALL COMP: CPT | Mod: 26

## 2023-06-27 PROCEDURE — 76770 US EXAM ABDO BACK WALL COMP: CPT

## 2023-06-28 ENCOUNTER — APPOINTMENT (OUTPATIENT)
Dept: GASTROENTEROLOGY | Facility: CLINIC | Age: 28
End: 2023-06-28
Payer: COMMERCIAL

## 2023-06-28 VITALS
OXYGEN SATURATION: 99 % | TEMPERATURE: 98 F | HEART RATE: 58 BPM | BODY MASS INDEX: 28.04 KG/M2 | DIASTOLIC BLOOD PRESSURE: 98 MMHG | SYSTOLIC BLOOD PRESSURE: 144 MMHG | HEIGHT: 68 IN | WEIGHT: 185 LBS

## 2023-06-28 DIAGNOSIS — K59.00 CONSTIPATION, UNSPECIFIED: ICD-10-CM

## 2023-06-28 DIAGNOSIS — K64.9 UNSPECIFIED HEMORRHOIDS: ICD-10-CM

## 2023-06-28 PROCEDURE — 99203 OFFICE O/P NEW LOW 30 MIN: CPT

## 2023-06-28 RX ORDER — DOCUSATE SODIUM 100 MG/1
100 CAPSULE ORAL
Qty: 30 | Refills: 2 | Status: ACTIVE | COMMUNITY
Start: 2023-06-28 | End: 1900-01-01

## 2023-06-28 RX ORDER — HYDROCORTISONE ACETATE 25 MG/1
25 SUPPOSITORY RECTAL
Qty: 14 | Refills: 1 | Status: ACTIVE | COMMUNITY
Start: 2023-06-28 | End: 1900-01-01

## 2023-06-28 NOTE — ASSESSMENT
[FreeTextEntry1] : 27-year-old black female, [employer] employee presented with chronic history of IBS, hemorrhoids and worsening constipation.  I discussed various management options including daily sitz bath's, Anusol HC suppositories every night, Colace every day in the morning.  I also prescribed IBS Rela for IBS-C\par I advised her to check last colonoscopy report relate to me if needs to be repeated sooner than 5 years.

## 2023-06-28 NOTE — HISTORY OF PRESENT ILLNESS
[FreeTextEntry1] : Sheron Anton is a 27-year-old black female with no significant past medical history has been having worsening constipation with bloating in the background of chronic history of IBS and internal hemorrhoids.  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  She also reports occasional rectal bleeding and swelling of the hemorrhoids.  Denied nausea vomiting abdominal pain or melena.  No reported weight loss.  She does not take NSAIDs on a regular basis.  She denied smoking and alcohol.\par Significant family history cancer, her father d diagnosed to have colon cancer at age 32.  She had colonoscopies twice in the past by Dr. Luke Adair most recent one in 2021 reportedly normal.  She also normal upper endoscopy

## 2023-06-28 NOTE — PHYSICAL EXAM
[Alert] : alert [Normal Voice/Communication] : normal voice/communication [Healthy Appearing] : healthy appearing [No Acute Distress] : no acute distress [Sclera] : the sclera and conjunctiva were normal [Hearing Threshold Finger Rub Not [place]] : hearing was normal [Normal Lips/Gums] : the lips and gums were normal [Oropharynx] : the oropharynx was normal [Normal Appearance] : the appearance of the neck was normal [No Neck Mass] : no neck mass was observed [No Respiratory Distress] : no respiratory distress [No Acc Muscle Use] : no accessory muscle use [Respiration, Rhythm And Depth] : normal respiratory rhythm and effort [Auscultation Breath Sounds / Voice Sounds] : lungs were clear to auscultation bilaterally [Heart Rate And Rhythm] : heart rate was normal and rhythm regular [Normal S1, S2] : normal S1 and S2 [Murmurs] : no murmurs [None] : no edema [Bowel Sounds] : normal bowel sounds [Abdomen Tenderness] : non-tender [No Masses] : no abdominal mass palpated [Abdomen Soft] : soft [Ascites: ___] : no ascites [Rebound Tenderness] : no rebound tenderness [Cervical Lymph Nodes Enlarged Posterior Bilaterally] : no posterior cervical lymphadenopathy [Supraclavicular Lymph Nodes Enlarged Bilaterally] : no supraclavicular lymphadenopathy [Cervical Lymph Nodes Enlarged Anterior Bilaterally] : no anterior cervical lymphadenopathy [No CVA Tenderness] : no CVA  tenderness [No Spinal Tenderness] : no spinal tenderness [Abnormal Walk] : normal gait [No Clubbing, Cyanosis] : no clubbing or cyanosis of the fingernails [Involuntary Movements] : no involuntary movements were seen [Normal Color / Pigmentation] : normal skin color and pigmentation [] : no rash [Normal Turgor] : normal skin turgor [No Focal Deficits] : no focal deficits [Oriented To Time, Place, And Person] : oriented to person, place, and time

## 2023-06-28 NOTE — REVIEW OF SYSTEMS
[Abdominal Pain] : abdominal pain [Constipation] : constipation [Bleeding] : bleeding [Bloating (gassiness)] : bloating [Negative] : Heme/Lymph

## 2023-06-29 ENCOUNTER — APPOINTMENT (OUTPATIENT)
Dept: MRI IMAGING | Facility: CLINIC | Age: 28
End: 2023-06-29
Payer: COMMERCIAL

## 2023-06-29 ENCOUNTER — OUTPATIENT (OUTPATIENT)
Dept: OUTPATIENT SERVICES | Facility: HOSPITAL | Age: 28
LOS: 1 days | End: 2023-06-29
Payer: COMMERCIAL

## 2023-06-29 DIAGNOSIS — Z00.8 ENCOUNTER FOR OTHER GENERAL EXAMINATION: ICD-10-CM

## 2023-06-29 DIAGNOSIS — R20.2 PARESTHESIA OF SKIN: ICD-10-CM

## 2023-06-29 PROCEDURE — 72157 MRI CHEST SPINE W/O & W/DYE: CPT | Mod: 26

## 2023-06-29 PROCEDURE — 70553 MRI BRAIN STEM W/O & W/DYE: CPT | Mod: 26

## 2023-06-29 PROCEDURE — 72157 MRI CHEST SPINE W/O & W/DYE: CPT

## 2023-06-29 PROCEDURE — A9585: CPT

## 2023-06-29 PROCEDURE — 70553 MRI BRAIN STEM W/O & W/DYE: CPT

## 2023-07-06 ENCOUNTER — NON-APPOINTMENT (OUTPATIENT)
Age: 28
End: 2023-07-06

## 2023-07-10 ENCOUNTER — LABORATORY RESULT (OUTPATIENT)
Age: 28
End: 2023-07-10

## 2023-07-11 ENCOUNTER — TRANSCRIPTION ENCOUNTER (OUTPATIENT)
Age: 28
End: 2023-07-11

## 2023-07-11 LAB
APPEARANCE: ABNORMAL
BILIRUBIN URINE: NEGATIVE
BLOOD URINE: NEGATIVE
COLOR: YELLOW
GLUCOSE QUALITATIVE U: NEGATIVE
KETONES URINE: NEGATIVE
LEUKOCYTE ESTERASE URINE: NEGATIVE
NITRITE URINE: NEGATIVE
PH URINE: 5.5
PROTEIN URINE: NEGATIVE
SPECIFIC GRAVITY URINE: >=1.03
UROBILINOGEN URINE: NORMAL

## 2023-07-26 ENCOUNTER — APPOINTMENT (OUTPATIENT)
Dept: RHEUMATOLOGY | Facility: CLINIC | Age: 28
End: 2023-07-26

## 2023-07-28 DIAGNOSIS — R76.8 OTHER SPECIFIED ABNORMAL IMMUNOLOGICAL FINDINGS IN SERUM: ICD-10-CM

## 2023-07-28 LAB
C3 SERPL-MCNC: 105 MG/DL
C4 SERPL-MCNC: 19 MG/DL
CCP AB SER IA-ACNC: <8 UNITS
CREAT SPEC-SCNC: 224 MG/DL
CREAT/PROT UR: 0.1 RATIO
ENA RNP AB SER IA-ACNC: 1.5 AL
ENA SM AB SER IA-ACNC: <0.2 AL
MYELOPEROXIDASE AB SER QL IA: <5 UNITS
MYELOPEROXIDASE CELLS FLD QL: NEGATIVE
PROT UR-MCNC: 11 MG/DL
PROTEINASE3 AB SER IA-ACNC: <5 UNITS
PROTEINASE3 AB SER-ACNC: NEGATIVE
RF+CCP IGG SER-IMP: NEGATIVE
RHEUMATOID FACT SER QL: <10 IU/ML
THYROGLOB AB SERPL-ACNC: <20 IU/ML
THYROPEROXIDASE AB SERPL IA-ACNC: <10 IU/ML

## 2023-08-14 ENCOUNTER — APPOINTMENT (OUTPATIENT)
Dept: NEUROLOGY | Facility: CLINIC | Age: 28
End: 2023-08-14
Payer: COMMERCIAL

## 2023-08-14 PROCEDURE — 95886 MUSC TEST DONE W/N TEST COMP: CPT

## 2023-08-14 PROCEDURE — 95909 NRV CNDJ TST 5-6 STUDIES: CPT

## 2023-08-14 NOTE — PROCEDURE
[FreeTextEntry1] :                                                                            Clinical  Neurophysiology  Laboratory [address] [place] [address]  EMG/NCV REPORT    Full Name:	Sheron Anton	YOB: 1995 Gender:	Female    Visit Date:	8/14/2023 14:23 Age:	27 Years 9 Months Old Examining Physician:	Lucina Referring Physician:	lucina Height:	5 feet 8 inch    Summary Sensory nerve conductions Right sural sensory nerve action potential had normal latency, normal amplitude, and normal conduction velocity.  Motor nerve conductions  Bilateral peroneal and tibial nerve compound motor action potential had normal latency, normal amplitude and normal conduction velocity.  The F-wave latency of the left peroneal nerve was prolonged. The bilateral tibial and right peroneal f wave latencies were within normal limits.  Needle EMG Needle EMG was performed using a disposable concentric needle in the following muscle groups:  Tibialis anterior, medial gastrocnemius, long head of the biceps femoris, vastus lateralis, gluteus medius muscles had no spontaneous activity and normal motor units.    Summary: normal study.  The f wave latency of left peroneal nerve was prolonged. Her symptoms are bilateral. This finding is less likely a clinically significant finding. There is no evidence of sensory neuropathy, no myopathy or lumbar radiculopathy. She will followup in 4-6 months or earlier if symptoms change.  Clinical and radiologic correlation is advised.  Thank you for the consult, Jose Verdugo MD Diplomat, American Board of Neurology and Psychiatry     Sensory NCS    Nerve / Sites	Rec. Site	Onset Lat	Peak Lat	NP Amp	PP Amp	Segments	Distance	Velocity 		ms	ms	V	V		cm	m/s R Sural - Ankle (Calf)    Calf	Ankle	1.51	2.03	6.5	45.3	Calf - Ankle	10	66       Motor NCS    Nerve / Sites	Muscle	Latency	Amplitude	Amp %	Duration	Segments	Distance	Lat Diff	Velocity 		ms	mV	%	ms		cm	ms	m/s L Peroneal - EDB    Ankle	EDB	4.32	8.4	100	5.83	Ankle - EDB	10		    Fib head	EDB	10.83	8.2	97.3	6.30	Fib head - Ankle	33	6.51	51    Pop fossa	EDB	12.81	8.1	96.3	6.51	Pop fossa - Fib head	10	1.98	51 R Peroneal - EDB    Ankle	EDB	3.70	9.3	100	6.04	Ankle - EDB	8		    Fib head	EDB	11.15	7.6	81.8	6.87	Fib head - Ankle	36	7.45	48    Pop fossa	EDB	12.86	7.2	77.2	7.19	Pop fossa - Fib head	12	1.72	70 L Tibial - AH    Ankle	AH	3.07	18.9	100	6.61	Ankle - AH	8		    Pop fossa	AH	12.66	16.0	84.7	7.24	Pop fossa - Ankle	40	9.58	42 R Tibial - AH    Ankle	AH	3.18	14.7	100	6.72	Ankle - AH	8		    Pop fossa	AH	12.50	15.6	106	7.66	Pop fossa - Ankle	40	9.32	43                F  Wave    Nerve	F Lat	M Lat	F-M Lat 	ms	ms	ms L Peroneal - EDB		53.1	 L Tibial - AH	51.8	2.7	49.2 R Tibial - AH	52.3	3.9	48.4 R Peroneal - EDB	49.4	4.0	45.4                EMG       EMG Summary Table	 	Spontaneous	MUAP	Recruitment Muscle	Nerve	Roots	IA	Fib	PSW	Fasc	H.F.	Amp	Dur.	PPP	Pattern R. Tibialis anterior	Deep peroneal (Fibular)	L4-L5	N	None	None	None	None	N	N	N	N R. Gastrocnemius (Medial head)	Tibial	S1-S2	N	None	None	None	None	N	N	N	N R. Biceps femoris (long head)	Sciatic (tibial division)	L5-S2	N	None	None	None	None	N	N	N	N R. Vastus lateralis	Femoral	L2-L4	N	None	None	None	None	N	N	N	N R. Gluteus medius	Superior gluteal	L4-S1	N	None	None	None	None	N	N	N	N

## 2023-08-31 NOTE — ED ADULT NURSE NOTE - CINV DISCH MEDS REVIEWED YN
Attended Phase II Intro to Exercise and Cardiac Rehab session. No medication or health history changes reported. See [facility] for details.   No

## 2023-09-11 ENCOUNTER — RX RENEWAL (OUTPATIENT)
Age: 28
End: 2023-09-11

## 2023-09-26 ENCOUNTER — RX RENEWAL (OUTPATIENT)
Age: 28
End: 2023-09-26

## 2023-11-03 ENCOUNTER — RX RENEWAL (OUTPATIENT)
Age: 28
End: 2023-11-03

## 2023-11-03 RX ORDER — DOCUSATE SODIUM 100 MG/1
100 CAPSULE, LIQUID FILLED ORAL
Qty: 30 | Refills: 2 | Status: ACTIVE | COMMUNITY
Start: 2023-11-03 | End: 1900-01-01

## 2023-12-19 ENCOUNTER — RX RENEWAL (OUTPATIENT)
Age: 28
End: 2023-12-19

## 2023-12-19 RX ORDER — ERGOCALCIFEROL 1.25 MG/1
1.25 MG CAPSULE, LIQUID FILLED ORAL
Qty: 12 | Refills: 0 | Status: ACTIVE | COMMUNITY
Start: 2023-06-07 | End: 1900-01-01

## 2023-12-20 ENCOUNTER — RX RENEWAL (OUTPATIENT)
Age: 28
End: 2023-12-20

## 2023-12-20 RX ORDER — TENAPANOR HYDROCHLORIDE 53.2 MG/1
50 TABLET ORAL
Qty: 60 | Refills: 2 | Status: ACTIVE | COMMUNITY
Start: 2023-06-28 | End: 1900-01-01

## 2023-12-22 ENCOUNTER — TRANSCRIPTION ENCOUNTER (OUTPATIENT)
Age: 28
End: 2023-12-22

## 2024-04-02 ENCOUNTER — APPOINTMENT (OUTPATIENT)
Dept: GASTROENTEROLOGY | Facility: CLINIC | Age: 29
End: 2024-04-02
Payer: COMMERCIAL

## 2024-04-02 VITALS
TEMPERATURE: 98 F | SYSTOLIC BLOOD PRESSURE: 145 MMHG | OXYGEN SATURATION: 98 % | WEIGHT: 185 LBS | HEART RATE: 61 BPM | BODY MASS INDEX: 28.04 KG/M2 | DIASTOLIC BLOOD PRESSURE: 92 MMHG | RESPIRATION RATE: 16 BRPM | HEIGHT: 68 IN

## 2024-04-02 PROCEDURE — 99213 OFFICE O/P EST LOW 20 MIN: CPT

## 2024-04-02 NOTE — REVIEW OF SYSTEMS
[As Noted in HPI] : as noted in HPI [Abdominal Pain] : abdominal pain [Constipation] : constipation [Swollen Glands] : swollen glands [Negative] : Heme/Lymph

## 2024-04-02 NOTE — HISTORY OF PRESENT ILLNESS
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  She felt better when she was taking IBSrela, but she stopped taking it for the past few weeks on her own because of 1-2 loose bowel movements which she describes as diarrhea.  She is feeling better other than occasional constipation and hard stools and gas and gas.  No other symptoms Appetite is okay.  She had colonoscopy in 2021 by Dr. Luke aaron for family history of colon cancer

## 2024-04-02 NOTE — PHYSICAL EXAM
[Normal Voice/Communication] : normal voice/communication [Alert] : alert [No Acute Distress] : no acute distress [Healthy Appearing] : healthy appearing [Hearing Threshold Finger Rub Not [place]] : hearing was normal [Sclera] : the sclera and conjunctiva were normal [Oropharynx] : the oropharynx was normal [Normal Lips/Gums] : the lips and gums were normal [Normal Appearance] : the appearance of the neck was normal [No Neck Mass] : no neck mass was observed [No Respiratory Distress] : no respiratory distress [No Acc Muscle Use] : no accessory muscle use [Respiration, Rhythm And Depth] : normal respiratory rhythm and effort [Auscultation Breath Sounds / Voice Sounds] : lungs were clear to auscultation bilaterally [Heart Rate And Rhythm] : heart rate was normal and rhythm regular [Normal S1, S2] : normal S1 and S2 [Murmurs] : no murmurs [Bowel Sounds] : normal bowel sounds [Abdomen Tenderness] : non-tender [No Masses] : no abdominal mass palpated [Abdomen Soft] : soft [Oriented To Time, Place, And Person] : oriented to person, place, and time [None] : no edema [Ascites: ___] : no ascites [Rebound Tenderness] : no rebound tenderness [Cervical Lymph Nodes Enlarged Posterior Bilaterally] : no posterior cervical lymphadenopathy [Supraclavicular Lymph Nodes Enlarged Bilaterally] : no supraclavicular lymphadenopathy [No CVA Tenderness] : no CVA  tenderness [Cervical Lymph Nodes Enlarged Anterior Bilaterally] : no anterior cervical lymphadenopathy [Abnormal Walk] : normal gait [No Spinal Tenderness] : no spinal tenderness [Involuntary Movements] : no involuntary movements were seen [No Clubbing, Cyanosis] : no clubbing or cyanosis of the fingernails [Normal Color / Pigmentation] : normal skin color and pigmentation [] : no rash [Normal Turgor] : normal skin turgor [No Focal Deficits] : no focal deficits

## 2024-05-22 ENCOUNTER — EMERGENCY (EMERGENCY)
Facility: HOSPITAL | Age: 29
LOS: 1 days | Discharge: HOME | End: 2024-05-22
Attending: EMERGENCY MEDICINE
Payer: COMMERCIAL

## 2024-05-22 ENCOUNTER — NON-APPOINTMENT (OUTPATIENT)
Age: 29
End: 2024-05-22

## 2024-05-22 VITALS
HEART RATE: 68 BPM | RESPIRATION RATE: 16 BRPM | SYSTOLIC BLOOD PRESSURE: 128 MMHG | DIASTOLIC BLOOD PRESSURE: 72 MMHG | TEMPERATURE: 98 F | OXYGEN SATURATION: 99 %

## 2024-05-22 VITALS
TEMPERATURE: 99 F | HEART RATE: 70 BPM | OXYGEN SATURATION: 100 % | WEIGHT: 184.97 LBS | HEIGHT: 68 IN | RESPIRATION RATE: 20 BRPM | DIASTOLIC BLOOD PRESSURE: 116 MMHG | SYSTOLIC BLOOD PRESSURE: 171 MMHG

## 2024-05-22 LAB
ALBUMIN SERPL ELPH-MCNC: 3.9 G/DL — SIGNIFICANT CHANGE UP (ref 3.3–5)
ALP SERPL-CCNC: 31 U/L — LOW (ref 40–120)
ALT FLD-CCNC: 9 U/L — LOW (ref 10–45)
ANION GAP SERPL CALC-SCNC: 10 MMOL/L — SIGNIFICANT CHANGE UP (ref 5–17)
AST SERPL-CCNC: 18 U/L — SIGNIFICANT CHANGE UP (ref 10–40)
BASOPHILS # BLD AUTO: 0.02 K/UL — SIGNIFICANT CHANGE UP (ref 0–0.2)
BASOPHILS NFR BLD AUTO: 0.5 % — SIGNIFICANT CHANGE UP (ref 0–2)
BILIRUB SERPL-MCNC: 0.7 MG/DL — SIGNIFICANT CHANGE UP (ref 0.2–1.2)
BUN SERPL-MCNC: 8 MG/DL — SIGNIFICANT CHANGE UP (ref 7–23)
CALCIUM SERPL-MCNC: 9.4 MG/DL — SIGNIFICANT CHANGE UP (ref 8.4–10.5)
CHLORIDE SERPL-SCNC: 106 MMOL/L — SIGNIFICANT CHANGE UP (ref 96–108)
CO2 SERPL-SCNC: 25 MMOL/L — SIGNIFICANT CHANGE UP (ref 22–31)
CREAT SERPL-MCNC: 0.92 MG/DL — SIGNIFICANT CHANGE UP (ref 0.5–1.3)
EGFR: 86 ML/MIN/1.73M2 — SIGNIFICANT CHANGE UP
EOSINOPHIL # BLD AUTO: 0.16 K/UL — SIGNIFICANT CHANGE UP (ref 0–0.5)
EOSINOPHIL NFR BLD AUTO: 4 % — SIGNIFICANT CHANGE UP (ref 0–6)
FLUAV AG NPH QL: SIGNIFICANT CHANGE UP
FLUBV AG NPH QL: SIGNIFICANT CHANGE UP
GLUCOSE SERPL-MCNC: 90 MG/DL — SIGNIFICANT CHANGE UP (ref 70–99)
HCG SERPL-ACNC: <2 MIU/ML — SIGNIFICANT CHANGE UP
HCT VFR BLD CALC: 37.5 % — SIGNIFICANT CHANGE UP (ref 34.5–45)
HGB BLD-MCNC: 12 G/DL — SIGNIFICANT CHANGE UP (ref 11.5–15.5)
IMM GRANULOCYTES NFR BLD AUTO: 0.2 % — SIGNIFICANT CHANGE UP (ref 0–0.9)
LYMPHOCYTES # BLD AUTO: 1.34 K/UL — SIGNIFICANT CHANGE UP (ref 1–3.3)
LYMPHOCYTES # BLD AUTO: 33.1 % — SIGNIFICANT CHANGE UP (ref 13–44)
MCHC RBC-ENTMCNC: 29.5 PG — SIGNIFICANT CHANGE UP (ref 27–34)
MCHC RBC-ENTMCNC: 32 GM/DL — SIGNIFICANT CHANGE UP (ref 32–36)
MCV RBC AUTO: 92.1 FL — SIGNIFICANT CHANGE UP (ref 80–100)
MONOCYTES # BLD AUTO: 0.4 K/UL — SIGNIFICANT CHANGE UP (ref 0–0.9)
MONOCYTES NFR BLD AUTO: 9.9 % — SIGNIFICANT CHANGE UP (ref 2–14)
NEUTROPHILS # BLD AUTO: 2.12 K/UL — SIGNIFICANT CHANGE UP (ref 1.8–7.4)
NEUTROPHILS NFR BLD AUTO: 52.3 % — SIGNIFICANT CHANGE UP (ref 43–77)
NRBC # BLD: 0 /100 WBCS — SIGNIFICANT CHANGE UP (ref 0–0)
PLATELET # BLD AUTO: 178 K/UL — SIGNIFICANT CHANGE UP (ref 150–400)
POTASSIUM SERPL-MCNC: 3.8 MMOL/L — SIGNIFICANT CHANGE UP (ref 3.5–5.3)
POTASSIUM SERPL-SCNC: 3.8 MMOL/L — SIGNIFICANT CHANGE UP (ref 3.5–5.3)
PROT SERPL-MCNC: 6.7 G/DL — SIGNIFICANT CHANGE UP (ref 6–8.3)
RBC # BLD: 4.07 M/UL — SIGNIFICANT CHANGE UP (ref 3.8–5.2)
RBC # FLD: 13.8 % — SIGNIFICANT CHANGE UP (ref 10.3–14.5)
RSV RNA NPH QL NAA+NON-PROBE: SIGNIFICANT CHANGE UP
SARS-COV-2 RNA SPEC QL NAA+PROBE: SIGNIFICANT CHANGE UP
SODIUM SERPL-SCNC: 141 MMOL/L — SIGNIFICANT CHANGE UP (ref 135–145)
WBC # BLD: 4.05 K/UL — SIGNIFICANT CHANGE UP (ref 3.8–10.5)
WBC # FLD AUTO: 4.05 K/UL — SIGNIFICANT CHANGE UP (ref 3.8–10.5)

## 2024-05-22 PROCEDURE — 99285 EMERGENCY DEPT VISIT HI MDM: CPT | Mod: GC

## 2024-05-22 PROCEDURE — 70496 CT ANGIOGRAPHY HEAD: CPT | Mod: MC

## 2024-05-22 PROCEDURE — 99285 EMERGENCY DEPT VISIT HI MDM: CPT

## 2024-05-22 PROCEDURE — 84702 CHORIONIC GONADOTROPIN TEST: CPT

## 2024-05-22 PROCEDURE — 99283 EMERGENCY DEPT VISIT LOW MDM: CPT

## 2024-05-22 PROCEDURE — 93005 ELECTROCARDIOGRAM TRACING: CPT

## 2024-05-22 PROCEDURE — 36000 PLACE NEEDLE IN VEIN: CPT

## 2024-05-22 PROCEDURE — 70498 CT ANGIOGRAPHY NECK: CPT | Mod: MC

## 2024-05-22 PROCEDURE — 80053 COMPREHEN METABOLIC PANEL: CPT

## 2024-05-22 PROCEDURE — 70498 CT ANGIOGRAPHY NECK: CPT | Mod: 26,MC

## 2024-05-22 PROCEDURE — 70450 CT HEAD/BRAIN W/O DYE: CPT | Mod: 26,MC,59

## 2024-05-22 PROCEDURE — 85025 COMPLETE CBC W/AUTO DIFF WBC: CPT

## 2024-05-22 PROCEDURE — 82962 GLUCOSE BLOOD TEST: CPT

## 2024-05-22 PROCEDURE — 70450 CT HEAD/BRAIN W/O DYE: CPT | Mod: MC

## 2024-05-22 PROCEDURE — 70496 CT ANGIOGRAPHY HEAD: CPT | Mod: 26,MC

## 2024-05-22 PROCEDURE — 99285 EMERGENCY DEPT VISIT HI MDM: CPT | Mod: 25

## 2024-05-22 PROCEDURE — 87637 SARSCOV2&INF A&B&RSV AMP PRB: CPT

## 2024-05-22 RX ORDER — SODIUM CHLORIDE 9 MG/ML
1000 INJECTION INTRAMUSCULAR; INTRAVENOUS; SUBCUTANEOUS ONCE
Refills: 0 | Status: COMPLETED | OUTPATIENT
Start: 2024-05-22 | End: 2024-05-22

## 2024-05-22 RX ADMIN — SODIUM CHLORIDE 1000 MILLILITER(S): 9 INJECTION INTRAMUSCULAR; INTRAVENOUS; SUBCUTANEOUS at 11:33

## 2024-05-22 NOTE — ED PROVIDER NOTE - OBJECTIVE STATEMENT
29 y/o female with no PMHx now presenting to the ED with left tongue numbness since 9am and left posterior bicep numbness since 7pm yesterday evening with associated left sided neck discomfort radiating up her head. Patient has not taken meds prior to arrival. Has no prior neck or back injuries. Patient reports it feels like lidocaine was injected. Had similar presentation one year ago and had negative work up. Has IUD mirena. Denied CP, SOB, abdominal pain, N/V/D, recent URI, smoking, prolonged sedentary period, fam h/o MS

## 2024-05-22 NOTE — ED PROVIDER NOTE - PATIENT PORTAL LINK FT
You can access the FollowMyHealth Patient Portal offered by [facility] by registering at the following website: [email or web address]. By joining [employer]’s FollowMyHealth portal, you will also be able to view your health information using other applications (apps) compatible with our system.

## 2024-05-22 NOTE — ED ADULT NURSE NOTE - NSFALLUNIVINTERV_ED_ALL_ED
Bed/Stretcher in lowest position, wheels locked, appropriate side rails in place/Call bell, personal items and telephone in reach/Instruct patient to call for assistance before getting out of bed/chair/stretcher/Non-slip footwear applied when patient is off stretcher/[place] to call system/Physically safe environment - no spills, clutter or unnecessary equipment/Purposeful proactive rounding/Room/bathroom lighting operational, light cord in reach

## 2024-05-22 NOTE — CONSULT NOTE ADULT - ATTENDING COMMENTS
28F no AC/AP hx episodic numbness in different anatomical regions (previously worked up by neurologist in past w/ neg findings). Today p/f L tongue numbness, L posterior bicep numbness, and L sided neck discomfort radiating up her head. MR brain 6/29/23 w/ no demyelinating lesions, masses, heme, or infarcts, Chiari I malformation. CTH today w/ redemonstrated Chiari I malformation, o/w wnl. Pt describes no difficulties w/ balance or coordination, or vision. Occasional headaches. Exam: neurointact    No acute intervention indicated    Will followup outpatient for her Chiari
HPI as per resident note, personally verified by me. Patient was in her usual state of health when she noted numbness on her L tongue on 5/20 when eating breakfast. The numbness then progressed to involve the posterolateral portion of her LUE to her wrist. She did have a mild headache and neck pain but she is prone to migraine headaches and it was not that severe. She had a similar presentation in 6/2023 and saw [facility] neurologist Dr. Jose Verdugo, who did MRI of brain and t-spine that was unrevealing for CNS demyelinating disease and symptoms resolved. Reports numbness continues but no other focal neurologic deficits and headache only 2-3/10 now. CT's here showed Chiari 1 malformation.    Neurologic exam as per resident note with additions as below:  AAO x3, speech fluent  CN's II-XII intact  Strength 5/5 all  Sens intact all except for very mild "change" (not specifically decrease) in posterolateral LUE  FtN intact b/l  Downgoing b/l plantar response    RVP (-)    A/P:  Skin sensation disturbance  Headache  Cervicalgia  Chiari 1 malformation    - Etiology for symptoms includes migraine with associated sensory phenomenon or other infectious/inflammatory process. Less likely CNS demyelinating disease given previous similar symptoms and unrevealing MRI's at that time. CT head and CTA head/neck, personally reviewed by me, with Chiari 1 malformation but no other acute intracranial findings, focal stenosis, occlusion, aneurysm, or venous sinus thrombosis. MRI brain and t-spine from 6/2023, also personally reviewed by me, with Chiari 1 malformation seen at that time (not commented on in report) without edema or upper cervical, lower cervical, or thoracic syrinx or other acute spinal findings; this is likely incidental and not contributing to current symptoms as would expect that to be more gradual and progressive as opposed to acute  - NRS to comment Chiari 1 malformation  - Check labs for additional causes with Lyme, WNV, ESR, CRP; can follow-up as outpatient  - Would likely benefit from outpatient MRI brain and c-spine w/ and w/o but does not have to stay for this  - No neurologic contraindications to discharge if patient is otherwise medically and surgically stable. She can follow-up with outpatient [facility] neurologist Dr. Jose Verdugo ([phone])  - Above recommendations discussed with ED team, who verbalized agreement and understanding  - Continue to address above medical problems, as you are doing  - Will sign off, please call ([phone]) with additional questions or concerns

## 2024-05-22 NOTE — ED ADULT NURSE NOTE - CHIEF COMPLAINT QUOTE
numbness of tongue 9 am yesterday and numbness of left arm 7 pm last night, neck pain left side radiating to back of head  this morning.  Fingerstick in ED 92 mg/dl

## 2024-05-22 NOTE — CONSULT NOTE ADULT - SUBJECTIVE AND OBJECTIVE BOX
p (1480)     54M hx COPD, chronic sinusitis, neuroendocrine tumor of the lung, diagnosed 8/2023 s/p chemo and radiation, now on monthly immunotherapy p/f 1mo brain fog (episodic confusion) and outpt MR w/ newly diagnosed brain met. MR w/ large R parietal mass (~4.3x4.2x4.5cm) with heterogenous enhancement and surrounding edema new since 10/4/2023. Exam: AOx3, no facial, EOMI, very subtle L drift, GAMBINO 5/5, SILT.    --Anticoagulation:    =====================  PAST MEDICAL HISTORY     PAST SURGICAL HISTORY     No Known Allergies      MEDICATIONS:  Antibiotics:    Neuro:    Other:      SOCIAL HISTORY:   Occupation:   Marital Status:     FAMILY HISTORY:      ROS: Negative except per HPI    LABS:                          12.0   4.05  )-----------( 178      ( 22 May 2024 11:44 )             37.5     05-22    141  |  106  |  8   ----------------------------<  90  3.8   |  25  |  0.92    Ca    9.4      22 May 2024 11:44    TPro  6.7  /  Alb  3.9  /  TBili  0.7  /  DBili  x   /  AST  18  /  ALT  9<L>  /  AlkPhos  31<L>  05-22       p (1675)         --Anticoagulation:    =====================  PAST MEDICAL HISTORY     PAST SURGICAL HISTORY     No Known Allergies      MEDICATIONS:  Antibiotics:    Neuro:    Other:      SOCIAL HISTORY:   Occupation:   Marital Status:     FAMILY HISTORY:      ROS: Negative except per HPI    LABS:                          12.0   4.05  )-----------( 178      ( 22 May 2024 11:44 )             37.5     05-22    141  |  106  |  8   ----------------------------<  90  3.8   |  25  |  0.92    Ca    9.4      22 May 2024 11:44    TPro  6.7  /  Alb  3.9  /  TBili  0.7  /  DBili  x   /  AST  18  /  ALT  9<L>  /  AlkPhos  31<L>  05-22       p (4710)     28F no AC/AP hx episodic numbness in different anatomical regions (previously worked up by neurologist in past w/ neg findings). Today p/f L tongue numbness, L posterior bicep numbness, and L sided neck discomfort radiating up her head. MR brain 6/29/23 w/ no demyelinating lesions, masses, heme, or infarcts, Chiari I malformation. CTH today w/ redemonstrated Chiari I malformation, o/w wnl. Pt describes no difficulties w/ balance or coordination, or vision. Occasional headaches. Exam: neurointact      --Anticoagulation:    =====================  PAST MEDICAL HISTORY     PAST SURGICAL HISTORY     No Known Allergies      MEDICATIONS:  Antibiotics:    Neuro:    Other:      SOCIAL HISTORY:   Occupation:   Marital Status:     FAMILY HISTORY:      ROS: Negative except per HPI    LABS:                          12.0   4.05  )-----------( 178      ( 22 May 2024 11:44 )             37.5     05-22    141  |  106  |  8   ----------------------------<  90  3.8   |  25  |  0.92    Ca    9.4      22 May 2024 11:44    TPro  6.7  /  Alb  3.9  /  TBili  0.7  /  DBili  x   /  AST  18  /  ALT  9<L>  /  AlkPhos  31<L>  05-22

## 2024-05-22 NOTE — ED ADULT NURSE NOTE - OBJECTIVE STATEMENT
29yo F [id number] denies PMHx, presents ambulatory to ED from [facility], as per pt reports yesterday at 0900 sudden onset a tongue numbness and tingling , at 1900 last night L arm numbness and tingling and this morning l side of the neck with radiation to the head , on examination Patient is well appearing, skin warm and intact, PERRL, EOM intact, sensory intact, equal strength b/l in all upper and lower extremities, Pt denies CP, SOB,, vision changes, n/v/d, fevers chills, abdominal pain. Safety and comfort measures initiated- bed placed in lowest position and side rails raised.

## 2024-05-22 NOTE — CONSULT NOTE ADULT - SUBJECTIVE AND OBJECTIVE BOX
Neurology - Consult Note    -  Spectra: [phone] ([facility]), [phone] ([facility])  -    HPI: Patient OLIVIA FAUST is a 28y (1995) wo/man with a PMHx significant for ***      Review of Systems:  INCOMPLETE   CONSTITUTIONAL: No fevers or chills  EYES AND ENT: No visual changes or no throat pain   NECK: No pain or stiffness  RESPIRATORY: No hemoptysis or shortness of breath  CARDIOVASCULAR: No chest pain or palpitations  GASTROINTESTINAL: No melena or hematochezia  GENITOURINARY: No dysuria or hematuria  NEUROLOGICAL: +As stated in HPI above  SKIN: No itching, burning, rashes, or lesions   All other review of systems is negative unless indicated above.    Allergies:  No Known Allergies      PMHx/PSHx/Family Hx: As above, otherwise see below       Social Hx:  No current use of tobacco, alcohol, or illicit drugs  Lives with ***    Medications:  MEDICATIONS  (STANDING):    MEDICATIONS  (PRN):      Vitals:  T(C): 37.3 (05-22-24 @ 11:13), Max: 37.3 (05-22-24 @ 11:13)  HR: 65 (05-22-24 @ 11:13) (65 - 70)  BP: 156/91 (05-22-24 @ 11:13) (156/91 - 171/116)  RR: 18 (05-22-24 @ 11:13) (18 - 20)  SpO2: 98% (05-22-24 @ 11:13) (98% - 100%)    Physical Examination: INCOMPLETE  General - NAD  Cardiovascular - Peripheral pulses palpable, no edema  Eyes - Fundoscopy with flat, sharp optic discs and no hemorrhage or exudates; Fundoscopy not well visualized; Fundoscopy not performed due to safety precautions in the setting of the COVID-19 pandemic    Neurologic Exam:  Mental status - Awake, Alert, Oriented to person, place, and time. Speech fluent, repetition and naming intact. Follows simple and complex commands. Attention/concentration, recent and remote memory (including registration and recall), and fund of knowledge intact    Cranial nerves - PERRLA, VFF, EOMI, face sensation (V1-V3) intact b/l, facial strength intact without asymmetry b/l, hearing intact b/l, palate with symmetric elevation, trapezius OR sternocleidomastiod 5/5 strength b/l, tongue midline on protrusion with full lateral movement    Motor - Normal bulk and tone throughout. No pronator drift.  Strength testing            Deltoid      Biceps      Triceps     Wrist Extension    Wrist Flexion     Interossei         R            5                 5               5                     5                              5                        5                 5  L             5                 5               5                     5                              5                        5                 5              Hip Flexion    Hip Extension    Knee Flexion    Knee Extension    Dorsiflexion    Plantar Flexion  R              5                           5                       5                           5                            5                          5  L              5                           5                        5                           5                            5                          5    Sensation - Light touch/temperature OR pain/vibration intact throughout    DTR's -             Biceps      Triceps     Brachioradialis      Patellar    Ankle    Toes/plantar response  R             2+             2+                  2+                       2+            2+                 Down  L              2+             2+                 2+                        2+           2+                 Down    Coordination - Finger to Nose intact b/l. No tremors appreciated    Gait and station - Normal casual gait. Romberg (-)    Labs:                        12.0   4.05  )-----------( 178      ( 22 May 2024 11:44 )             37.5     05-22    141  |  106  |  8   ----------------------------<  90  3.8   |  25  |  0.92    Ca    9.4      22 May 2024 11:44    TPro  6.7  /  Alb  3.9  /  TBili  0.7  /  DBili  x   /  AST  18  /  ALT  9<L>  /  AlkPhos  31<L>  05-22    CAPILLARY BLOOD GLUCOSE      POCT Blood Glucose.: 92 mg/dL (22 May 2024 10:42)    LIVER FUNCTIONS - ( 22 May 2024 11:44 )  Alb: 3.9 g/dL / Pro: 6.7 g/dL / ALK PHOS: 31 U/L / ALT: 9 U/L / AST: 18 U/L / GGT: x               CSF:                  Radiology:  CT Head No Cont:  (22 May 2024 12:09)     Neurology - Consult Note    -  Spectra: [phone] ([facility]), [phone] ([facility])  -    HPI: Patient OLIVIA FAUST is a 28y (1995) wo/man with a PMHx significant for ***      Review of Systems:  INCOMPLETE   CONSTITUTIONAL: No fevers or chills  EYES AND ENT: No visual changes or no throat pain   NECK: No pain or stiffness  RESPIRATORY: No hemoptysis or shortness of breath  CARDIOVASCULAR: No chest pain or palpitations  GASTROINTESTINAL: No melena or hematochezia  GENITOURINARY: No dysuria or hematuria  NEUROLOGICAL: +As stated in HPI above  SKIN: No itching, burning, rashes, or lesions   All other review of systems is negative unless indicated above.    Allergies:  No Known Allergies      PMHx/PSHx/Family Hx: As above, otherwise see below       Social Hx:  No current use of tobacco, alcohol, or illicit drugs  Lives with ***    Medications:  MEDICATIONS  (STANDING):    MEDICATIONS  (PRN):      Vitals:  T(C): 37.3 (05-22-24 @ 11:13), Max: 37.3 (05-22-24 @ 11:13)  HR: 65 (05-22-24 @ 11:13) (65 - 70)  BP: 156/91 (05-22-24 @ 11:13) (156/91 - 171/116)  RR: 18 (05-22-24 @ 11:13) (18 - 20)  SpO2: 98% (05-22-24 @ 11:13) (98% - 100%)    Physical Examination: INCOMPLETE  General - NAD  Cardiovascular - Peripheral pulses palpable, no edema  Eyes - Fundoscopy with flat, sharp optic discs and no hemorrhage or exudates; Fundoscopy not well visualized; Fundoscopy not performed due to safety precautions in the setting of the COVID-19 pandemic    Neurologic Exam:  Mental status - Awake, Alert, Oriented to person, place, and time. Speech fluent, repetition and naming intact. Follows simple and complex commands. Attention/concentration, recent and remote memory (including registration and recall), and fund of knowledge intact    Cranial nerves - PERRLA, VFF, EOMI, face sensation (V1-V3) intact b/l, facial strength intact without asymmetry b/l, hearing intact b/l, palate with symmetric elevation, trapezius OR sternocleidomastiod 5/5 strength b/l, tongue midline on protrusion with full lateral movement    Motor - Normal bulk and tone throughout. No pronator drift.  Strength testing            Deltoid      Biceps      Triceps     Wrist Extension    Wrist Flexion     Interossei         R            5                 5               5                     5                              5                        5                 5  L             5                 5               5                     5                              5                        5                 5              Hip Flexion    Hip Extension    Knee Flexion    Knee Extension    Dorsiflexion    Plantar Flexion  R              5                           5                       5                           5                            5                          5  L              5                           5                        5                           5                            5                          5    Sensation - Light touch/temperature OR pain/vibration intact throughout    DTR's -             Biceps      Triceps     Brachioradialis      Patellar    Ankle    Toes/plantar response  R             2+             2+                  2+                       2+            2+                 Down  L              2+             2+                 2+                        2+           2+                 Down    Coordination - Finger to Nose intact b/l. No tremors appreciated    Gait and station - Normal casual gait. Romberg (-)    Labs:                        12.0   4.05  )-----------( 178      ( 22 May 2024 11:44 )             37.5     05-22    141  |  106  |  8   ----------------------------<  90  3.8   |  25  |  0.92    Ca    9.4      22 May 2024 11:44    TPro  6.7  /  Alb  3.9  /  TBili  0.7  /  DBili  x   /  AST  18  /  ALT  9<L>  /  AlkPhos  31<L>  05-22    CAPILLARY BLOOD GLUCOSE      POCT Blood Glucose.: 92 mg/dL (22 May 2024 10:42)    LIVER FUNCTIONS - ( 22 May 2024 11:44 )  Alb: 3.9 g/dL / Pro: 6.7 g/dL / ALK PHOS: 31 U/L / ALT: 9 U/L / AST: 18 U/L / GGT: x           Radiology:  CT Head No Cont:  (22 May 2024 12:09)  CTA H/N  AORTIC ARCH no dissection or aneurysmal dilatation is noted. Major vessel origins are patent. The subclavian arteries are well visualized and appear to be patent bilaterally.  COMMON CAROTID ARTERIES: Bilaterally patent with tortuosity  RIGHT BIFURCATION: Widely patent  LEFT BIFURCATION: Widely patent  INTERNAL CAROTID ARTERIES: Bilaterally patent with tortuosity  VERTEBRALS bilaterally patent with a a much larger right vertebral artery.  MISCELLANEOUS: Prevertebral soft tissues are unremarkable.      BRAIN both hemispheres appear to be symmetric with no CT evidence of an acute infarct, hemorrhage, or space-occupying lesion. No hydrocephalus or collections are noted.There is low position of the cerebellar tonsils indicative of a Chiari I malformation. Cerebellar tonsils reach C1, and there is diffuse effacement of the cisterna magna.    The petrous, cavernous, and supraclinoid carotid arteries are bilaterally patent  Both anterior cerebral arteries are patent. Both A1 segments are well formed.  Both middle cerebral arteries are patent. No M1 lesion or distal branch occlusion suggested.  Posterior cerebral arteries are patent.  Vertebrobasilar system is patent.  The venous sinuses are patent.      IMPRESSION:  1. The CT study demonstrates a Chiari I malformation with low cerebellar tonsils and diffuse effacement of the cisterna magna. The brain is otherwise unremarkable in appearance. Follow-up MR imaging of the brain and spine recommended for further assessment.  2. Widely patent head and neck vasculature, as outlined above.       Neurology - Consult Note    -  Spectra: [phone] ([facility]), [phone] ([facility])  -    HPI: Patient OLIVIA FAUST is a 28y (1995) woman with no pertinent PMH presenting with acute left sided tongue and arm numbness as well as a left sided headache. Pt noticed a patch of the left side of her tongue was numb while eating breakfast Monday morning. Tuesday night she noticed that the back of her arm up to her wrist was numb. This morning she developed a left sided headache. She reports that her symptoms are constant, they do not radiate, and that nothing makes them better or worse. She has felt something similar to this before. About 1 year ago pt had intermittent leg weakness that lasted for several months. She was f/u outpatient for this concern and EMG findings were normal. Pt reports no changes in diet and is not taking any medications. She does take vitamin D and B12 supplements as well as stool softener (Colase) as needed. She does not report any trauma, sick contacts, changes in vision, vomiting, nausea or changes in functionality or sensation of her arm.      Review of Systems:     Allergies:  No Known Allergies      PMHx/PSHx/Family Hx: As above, otherwise see below       Social Hx:  No current use of tobacco, alcohol, or illicit drugs  Lives with ***    Medications:  MEDICATIONS  (STANDING):    MEDICATIONS  (PRN):      Vitals:  T(C): 37.3 (05-22-24 @ 11:13), Max: 37.3 (05-22-24 @ 11:13)  HR: 65 (05-22-24 @ 11:13) (65 - 70)  BP: 156/91 (05-22-24 @ 11:13) (156/91 - 171/116)  RR: 18 (05-22-24 @ 11:13) (18 - 20)  SpO2: 98% (05-22-24 @ 11:13) (98% - 100%)    Physical Examination: INCOMPLETE  General - NAD    Neurologic Exam:  Mental status - Awake, Alert, Oriented to person, place, and time. Speech fluent, repetition and naming intact. Follows simple and complex commands. Attention/concentration, recent and remote memory (including registration and recall), and fund of knowledge intact    Cranial nerves - PERRLA, VFF, EOMI, face sensation (V1-V3) intact b/l, facial strength intact without asymmetry b/l, hearing intact b/l, palate with symmetric elevation, trapezius 5/5 strength b/l, tongue midline on protrusion with full lateral movement    Motor - Normal bulk and tone throughout. No pronator drift.  Strength testing            Deltoid      Biceps      Triceps     Wrist Extension    Wrist Flexion     Interossei         R            5                 5               5                     5                              5                        5                 5  L             5                 5               5                     5                              5                        5                 5              Hip Flexion    Hip Extension    Knee Flexion    Knee Extension    Dorsiflexion    Plantar Flexion  R              5                           5                       5                           5                            5                          5  L              5                           5                        5                           5                            5                          5    Sensation - Light touch temperature  intact throughout    DTR's -             Biceps      Triceps     Brachioradialis      Patellar    Ankle    Toes/plantar response  R             2+             2+                  2+                       2+            2+                 Down  L              2+             2+                 2+                        2+           2+                 Down    Coordination - Finger to Nose intact b/l. No tremors appreciated    Gait and station - Normal casual gait. Romberg (+)    Labs:                        12.0   4.05  )-----------( 178      ( 22 May 2024 11:44 )             37.5     05-22    141  |  106  |  8   ----------------------------<  90  3.8   |  25  |  0.92    Ca    9.4      22 May 2024 11:44    TPro  6.7  /  Alb  3.9  /  TBili  0.7  /  DBili  x   /  AST  18  /  ALT  9<L>  /  AlkPhos  31<L>  05-22    CAPILLARY BLOOD GLUCOSE      POCT Blood Glucose.: 92 mg/dL (22 May 2024 10:42)    LIVER FUNCTIONS - ( 22 May 2024 11:44 )  Alb: 3.9 g/dL / Pro: 6.7 g/dL / ALK PHOS: 31 U/L / ALT: 9 U/L / AST: 18 U/L / GGT: x           Radiology:  CT Head No Cont:  (22 May 2024 12:09)  CTA H/N  AORTIC ARCH no dissection or aneurysmal dilatation is noted. Major vessel origins are patent. The subclavian arteries are well visualized and appear to be patent bilaterally.  COMMON CAROTID ARTERIES: Bilaterally patent with tortuosity  RIGHT BIFURCATION: Widely patent  LEFT BIFURCATION: Widely patent  INTERNAL CAROTID ARTERIES: Bilaterally patent with tortuosity  VERTEBRALS bilaterally patent with a a much larger right vertebral artery.  MISCELLANEOUS: Prevertebral soft tissues are unremarkable.      BRAIN both hemispheres appear to be symmetric with no CT evidence of an acute infarct, hemorrhage, or space-occupying lesion. No hydrocephalus or collections are noted.There is low position of the cerebellar tonsils indicative of a Chiari I malformation. Cerebellar tonsils reach C1, and there is diffuse effacement of the cisterna magna.    The petrous, cavernous, and supraclinoid carotid arteries are bilaterally patent  Both anterior cerebral arteries are patent. Both A1 segments are well formed.  Both middle cerebral arteries are patent. No M1 lesion or distal branch occlusion suggested.  Posterior cerebral arteries are patent.  Vertebrobasilar system is patent.  The venous sinuses are patent.      IMPRESSION:  1. The CT study demonstrates a Chiari I malformation with low cerebellar tonsils and diffuse effacement of the cisterna magna. The brain is otherwise unremarkable in appearance. Follow-up MR imaging of the brain and spine recommended for further assessment.  2. Widely patent head and neck vasculature, as outlined above.       Neurology - Consult Note    -  Spectra: [phone] ([facility]), [phone] ([facility])  -    HPI: Patient OLIVIA FAUST is a 28y (1995) woman who works as a  with no pertinent PMH presenting with acute left sided tongue and arm numbness as well as a left sided headache. Pt noticed a patch of the left side of her tongue was numb while eating breakfast Monday morning. Tuesday night she noticed that the back of her arm up to her wrist was numb. This morning she developed a left sided headache. She reports that her symptoms are constant, they do not radiate, and that nothing makes them better or worse. She has felt something similar to this before. About 1 year ago pt had intermittent leg weakness that lasted for several months. She was f/u outpatient for this concern and EMG findings were normal. Pt reports no changes in diet and is not taking any medications. She does take vitamin D and B12 supplements as well as stool softener (Colase) as needed. She does not report any trauma, sick contacts, changes in vision, vomiting, nausea or changes in functionality or sensation of her arm.      Review of Systems:     Allergies:  No Known Allergies      PMHx/PSHx/Family Hx: As above, otherwise see below       Social Hx:  No current use of tobacco, alcohol, or illicit drugs  Lives with ***    Medications:  MEDICATIONS  (STANDING):    MEDICATIONS  (PRN):      Vitals:  T(C): 37.3 (05-22-24 @ 11:13), Max: 37.3 (05-22-24 @ 11:13)  HR: 65 (05-22-24 @ 11:13) (65 - 70)  BP: 156/91 (05-22-24 @ 11:13) (156/91 - 171/116)  RR: 18 (05-22-24 @ 11:13) (18 - 20)  SpO2: 98% (05-22-24 @ 11:13) (98% - 100%)    Physical Examination: INCOMPLETE  General - NAD    Neurologic Exam:  Mental status - Awake, Alert, Oriented to person, place, and time. Speech fluent, repetition and naming intact. Follows simple and complex commands. Attention/concentration, recent and remote memory (including registration and recall), and fund of knowledge intact    Cranial nerves - PERRLA, VFF, EOMI, face sensation (V1-V3) intact b/l, facial strength intact without asymmetry b/l, hearing intact b/l, palate with symmetric elevation, trapezius 5/5 strength b/l, tongue midline on protrusion with full lateral movement    Motor - Normal bulk and tone throughout. No pronator drift.  Strength testing            Deltoid      Biceps      Triceps     Wrist Extension    Wrist Flexion     Interossei         R            5                 5               5                     5                              5                        5                 5  L             5                 5               5                     5                              5                        5                 5              Hip Flexion    Hip Extension    Knee Flexion    Knee Extension    Dorsiflexion    Plantar Flexion  R              5                           5                       5                           5                            5                          5  L              5                           5                        5                           5                            5                          5    Sensation - Light touch temperature intact throughout    DTR's -             Biceps      Triceps     Brachioradialis      Patellar    Ankle    Toes/plantar response  R             2+             2+                  2+                       2+            2+                 Down  L              2+             2+                 2+                        2+           2+                 Down    Coordination - Finger to Nose intact b/l. No tremors appreciated    Gait and station - Normal casual gait. Romberg (+)    Labs:                        12.0   4.05  )-----------( 178      ( 22 May 2024 11:44 )             37.5     05-22    141  |  106  |  8   ----------------------------<  90  3.8   |  25  |  0.92    Ca    9.4      22 May 2024 11:44    TPro  6.7  /  Alb  3.9  /  TBili  0.7  /  DBili  x   /  AST  18  /  ALT  9<L>  /  AlkPhos  31<L>  05-22    CAPILLARY BLOOD GLUCOSE      POCT Blood Glucose.: 92 mg/dL (22 May 2024 10:42)    LIVER FUNCTIONS - ( 22 May 2024 11:44 )  Alb: 3.9 g/dL / Pro: 6.7 g/dL / ALK PHOS: 31 U/L / ALT: 9 U/L / AST: 18 U/L / GGT: x           Radiology:  CT Head No Cont:  (22 May 2024 12:09)  CTA H/N  AORTIC ARCH no dissection or aneurysmal dilatation is noted. Major vessel origins are patent. The subclavian arteries are well visualized and appear to be patent bilaterally.  COMMON CAROTID ARTERIES: Bilaterally patent with tortuosity  RIGHT BIFURCATION: Widely patent  LEFT BIFURCATION: Widely patent  INTERNAL CAROTID ARTERIES: Bilaterally patent with tortuosity  VERTEBRALS bilaterally patent with a a much larger right vertebral artery.  MISCELLANEOUS: Prevertebral soft tissues are unremarkable.      BRAIN both hemispheres appear to be symmetric with no CT evidence of an acute infarct, hemorrhage, or space-occupying lesion. No hydrocephalus or collections are noted.There is low position of the cerebellar tonsils indicative of a Chiari I malformation. Cerebellar tonsils reach C1, and there is diffuse effacement of the cisterna magna.    The petrous, cavernous, and supraclinoid carotid arteries are bilaterally patent  Both anterior cerebral arteries are patent. Both A1 segments are well formed.  Both middle cerebral arteries are patent. No M1 lesion or distal branch occlusion suggested.  Posterior cerebral arteries are patent.  Vertebrobasilar system is patent.  The venous sinuses are patent.      IMPRESSION:  1. The CT study demonstrates a Chiari I malformation with low cerebellar tonsils and diffuse effacement of the cisterna magna. The brain is otherwise unremarkable in appearance. Follow-up MR imaging of the brain and spine recommended for further assessment.  2. Widely patent head and neck vasculature, as outlined above.       Neurology - Consult Note    -  Spectra: [phone] ([facility]), [phone] ([facility])  -    HPI: Patient OLIVIA FAUST is a 28y (1995) woman who works as a  with no pertinent PMHx presenting with acute left sided tongue and arm numbness as well as a left sided headache. Pt noticed a patch of the left side of her tongue was numb while eating breakfast Monday morning. Tuesday night she noticed that the back of her arm up to her wrist was numb. This morning she developed a left sided headache. She reports that her symptoms are constant, they do not radiate, and that nothing makes them better or worse. She has felt something similar to this before. About 1 year ago pt had intermittent leg weakness that lasted for several months. She was f/u outpatient for this concern and EMG findings were normal. Pt reports no changes in diet and is not taking any medications. She does take vitamin D and B12 supplements as well as stool softener (Colace) as needed. She does not report any trauma, sick contacts, changes in vision, vomiting, nausea or changes in motor function of her arm.      Review of Systems:     Allergies:  No Known Allergies      PMHx/PSHx/Family Hx: As above, otherwise see below       Social Hx:  No current use of tobacco, alcohol, or illicit drugs  Lives with ***    Medications:  MEDICATIONS  (STANDING):    MEDICATIONS  (PRN):      Vitals:  T(C): 37.3 (05-22-24 @ 11:13), Max: 37.3 (05-22-24 @ 11:13)  HR: 65 (05-22-24 @ 11:13) (65 - 70)  BP: 156/91 (05-22-24 @ 11:13) (156/91 - 171/116)  RR: 18 (05-22-24 @ 11:13) (18 - 20)  SpO2: 98% (05-22-24 @ 11:13) (98% - 100%)    Physical Examination: INCOMPLETE  General - NAD    Neurologic Exam:  Mental status - Awake, Alert, Oriented to person, place, and time. Speech fluent, repetition and naming intact. Follows simple and complex commands. Attention/concentration, recent and remote memory (including registration and recall), and fund of knowledge intact    Cranial nerves - PERRLA, VFF, EOMI, face sensation (V1-V3) intact b/l, facial strength intact without asymmetry b/l, hearing intact b/l, palate with symmetric elevation, trapezius 5/5 strength b/l, tongue midline on protrusion with full lateral movement    Motor - Normal bulk and tone throughout. No pronator drift.  Strength testing            Deltoid      Biceps      Triceps     Wrist Extension    Wrist Flexion     Interossei         R            5                 5               5                     5                              5                        5                 5  L             5                 5               5                     5                              5                        5                 5              Hip Flexion    Hip Extension    Knee Flexion    Knee Extension    Dorsiflexion    Plantar Flexion  R              5                           5                       5                           5                            5                          5  L              5                           5                        5                           5                            5                          5    Sensation - Light touch temperature intact throughout    DTR's -             Biceps      Triceps     Brachioradialis      Patellar    Ankle    Toes/plantar response  R             2+             2+                  2+                       2+            2+                 Down  L              2+             2+                 2+                        2+           2+                 Down    Coordination - Finger to Nose intact b/l. No tremors appreciated    Gait and station - Normal casual gait. Romberg (+)    Labs:                        12.0   4.05  )-----------( 178      ( 22 May 2024 11:44 )             37.5     05-22    141  |  106  |  8   ----------------------------<  90  3.8   |  25  |  0.92    Ca    9.4      22 May 2024 11:44    TPro  6.7  /  Alb  3.9  /  TBili  0.7  /  DBili  x   /  AST  18  /  ALT  9<L>  /  AlkPhos  31<L>  05-22    CAPILLARY BLOOD GLUCOSE      POCT Blood Glucose.: 92 mg/dL (22 May 2024 10:42)    LIVER FUNCTIONS - ( 22 May 2024 11:44 )  Alb: 3.9 g/dL / Pro: 6.7 g/dL / ALK PHOS: 31 U/L / ALT: 9 U/L / AST: 18 U/L / GGT: x           Radiology:  CT Head No Cont:  (22 May 2024 12:09)  CTA H/N  AORTIC ARCH no dissection or aneurysmal dilatation is noted. Major vessel origins are patent. The subclavian arteries are well visualized and appear to be patent bilaterally.  COMMON CAROTID ARTERIES: Bilaterally patent with tortuosity  RIGHT BIFURCATION: Widely patent  LEFT BIFURCATION: Widely patent  INTERNAL CAROTID ARTERIES: Bilaterally patent with tortuosity  VERTEBRALS bilaterally patent with a a much larger right vertebral artery.  MISCELLANEOUS: Prevertebral soft tissues are unremarkable.      BRAIN both hemispheres appear to be symmetric with no CT evidence of an acute infarct, hemorrhage, or space-occupying lesion. No hydrocephalus or collections are noted.There is low position of the cerebellar tonsils indicative of a Chiari I malformation. Cerebellar tonsils reach C1, and there is diffuse effacement of the cisterna magna.    The petrous, cavernous, and supraclinoid carotid arteries are bilaterally patent  Both anterior cerebral arteries are patent. Both A1 segments are well formed.  Both middle cerebral arteries are patent. No M1 lesion or distal branch occlusion suggested.  Posterior cerebral arteries are patent.  Vertebrobasilar system is patent.  The venous sinuses are patent.      IMPRESSION:  1. The CT study demonstrates a Chiari I malformation with low cerebellar tonsils and diffuse effacement of the cisterna magna. The brain is otherwise unremarkable in appearance. Follow-up MR imaging of the brain and spine recommended for further assessment.  2. Widely patent head and neck vasculature, as outlined above.       Neurology - Consult Note    -  Spectra: [phone] ([facility]), [phone] ([facility])  -    HPI: Patient OLIVIA FAUST is a 28y (1995) woman who works as a  with no pertinent PMHx presenting with acute left sided tongue and arm numbness as well as a left sided headache. Pt noticed a patch of the left side of her tongue was numb while eating breakfast Monday morning. Tuesday night she noticed that the back of her arm up to her wrist was numb. This morning she developed a left sided headache. She reports that her symptoms are constant and that nothing makes them better or worse. She has felt something similar to this before. About 1 year ago pt had intermittent leg weakness that lasted for several months. She was f/u outpatient for this concern and EMG findings were normal. Pt reports no changes in diet and is not taking any medications. She does take vitamin D and B12 supplements as well as stool softener (Colace) as needed. She does not report any trauma, sick contacts, changes in vision, vomiting, nausea or changes in motor function of her arm.      Review of Systems:     Allergies:  No Known Allergies      PMHx/PSHx/Family Hx: As above, otherwise see below       Social Hx:  No current use of tobacco, alcohol, or illicit drugs  Lives with ***    Medications:  MEDICATIONS  (STANDING):    MEDICATIONS  (PRN):      Vitals:  T(C): 37.3 (05-22-24 @ 11:13), Max: 37.3 (05-22-24 @ 11:13)  HR: 65 (05-22-24 @ 11:13) (65 - 70)  BP: 156/91 (05-22-24 @ 11:13) (156/91 - 171/116)  RR: 18 (05-22-24 @ 11:13) (18 - 20)  SpO2: 98% (05-22-24 @ 11:13) (98% - 100%)    Physical Examination: INCOMPLETE  General - NAD    Neurologic Exam:  Mental status - Awake, Alert, Oriented to person, place, and time. Speech fluent, repetition and naming intact. Follows simple and complex commands. Attention/concentration, recent and remote memory (including registration and recall), and fund of knowledge intact    Cranial nerves - PERRLA, VFF, EOMI, face sensation (V1-V3) intact b/l, facial strength intact without asymmetry b/l, hearing intact b/l, palate with symmetric elevation, trapezius 5/5 strength b/l, tongue midline on protrusion with full lateral movement    Motor - Normal bulk and tone throughout. No pronator drift.  Strength testing            Deltoid      Biceps      Triceps     Wrist Extension    Wrist Flexion     Interossei         R            5                 5               5                     5                              5                        5                 5  L             5                 5               5                     5                              5                        5                 5              Hip Flexion    Hip Extension    Knee Flexion    Knee Extension    Dorsiflexion    Plantar Flexion  R              5                           5                       5                           5                            5                          5  L              5                           5                        5                           5                            5                          5    Sensation - Light touch temperature intact throughout    DTR's -             Biceps      Triceps     Brachioradialis      Patellar    Ankle    Toes/plantar response  R             2+             2+                  2+                       2+            2+                 Down  L              2+             2+                 2+                        2+           2+                 Down    Coordination - Finger to Nose intact b/l. No tremors appreciated    Gait and station - Normal casual gait. Romberg (+)    Labs:                        12.0   4.05  )-----------( 178      ( 22 May 2024 11:44 )             37.5     05-22    141  |  106  |  8   ----------------------------<  90  3.8   |  25  |  0.92    Ca    9.4      22 May 2024 11:44    TPro  6.7  /  Alb  3.9  /  TBili  0.7  /  DBili  x   /  AST  18  /  ALT  9<L>  /  AlkPhos  31<L>  05-22    CAPILLARY BLOOD GLUCOSE      POCT Blood Glucose.: 92 mg/dL (22 May 2024 10:42)    LIVER FUNCTIONS - ( 22 May 2024 11:44 )  Alb: 3.9 g/dL / Pro: 6.7 g/dL / ALK PHOS: 31 U/L / ALT: 9 U/L / AST: 18 U/L / GGT: x           Radiology:  CT Head No Cont:  (22 May 2024 12:09)  CTA H/N  AORTIC ARCH no dissection or aneurysmal dilatation is noted. Major vessel origins are patent. The subclavian arteries are well visualized and appear to be patent bilaterally.  COMMON CAROTID ARTERIES: Bilaterally patent with tortuosity  RIGHT BIFURCATION: Widely patent  LEFT BIFURCATION: Widely patent  INTERNAL CAROTID ARTERIES: Bilaterally patent with tortuosity  VERTEBRALS bilaterally patent with a a much larger right vertebral artery.  MISCELLANEOUS: Prevertebral soft tissues are unremarkable.      BRAIN both hemispheres appear to be symmetric with no CT evidence of an acute infarct, hemorrhage, or space-occupying lesion. No hydrocephalus or collections are noted.There is low position of the cerebellar tonsils indicative of a Chiari I malformation. Cerebellar tonsils reach C1, and there is diffuse effacement of the cisterna magna.    The petrous, cavernous, and supraclinoid carotid arteries are bilaterally patent  Both anterior cerebral arteries are patent. Both A1 segments are well formed.  Both middle cerebral arteries are patent. No M1 lesion or distal branch occlusion suggested.  Posterior cerebral arteries are patent.  Vertebrobasilar system is patent.  The venous sinuses are patent.      IMPRESSION:  1. The CT study demonstrates a Chiari I malformation with low cerebellar tonsils and diffuse effacement of the cisterna magna. The brain is otherwise unremarkable in appearance. Follow-up MR imaging of the brain and spine recommended for further assessment.  2. Widely patent head and neck vasculature, as outlined above.       Neurology - Consult Note    -  Spectra: [phone] ([facility]), [phone] ([facility])  -    HPI: Patient OLIVIA FAUST is a 28y (1995) woman who works as a  with a pmhx significant for occasional migraines presenting with acute left sided tongue and arm numbness as well as a left sided headache. Pt noticed a patch of the left side of her tongue was numb while eating breakfast Monday morning. Tuesday night she noticed that the back of her arm up to her wrist was numb. This morning she developed a left sided headache. She reports that her symptoms are constant and that nothing makes them better or worse. She has felt something similar to this before. About 1 year ago pt had intermittent leg numbness that lasted for several months then spontaneously resolved. She was saw outpatient neurologist, Dr. Jose Verdugo, and underwent MRIs and EMG. MRI brain and thoracic spine ww/o showed no demyelinating lesions or abnormal enhancement. EMG of LE were normal. Pt reports no changes in diet and is not taking any medications. She does take vitamin D and B12 supplements as well as stool softener (Colace) as needed. She does not report any trauma, sick contacts, changes in vision, vomiting, nausea or changes in motor function of her arm.    Review of Systems: refer to hpi      Allergies: No Known Allergies    PMHx/PSHx/Family Hx: As above, otherwise see below     Social Hx: No current use of tobacco, alcohol, or illicit drugs    Medications:  MEDICATIONS  (STANDING):  MEDICATIONS  (PRN):    Vitals:  T(C): 37.3 (05-22-24 @ 11:13), Max: 37.3 (05-22-24 @ 11:13)  HR: 65 (05-22-24 @ 11:13) (65 - 70)  BP: 156/91 (05-22-24 @ 11:13) (156/91 - 171/116)  RR: 18 (05-22-24 @ 11:13) (18 - 20)  SpO2: 98% (05-22-24 @ 11:13) (98% - 100%)    Physical Examination:   General - NAD    Neurologic Exam:  Mental status - Awake, Alert, Oriented to person, place, and time. Speech fluent, repetition and naming intact. Follows simple complex commands.     Cranial nerves - PERRLA, VFF, EOMI, face sensation (V1-V3) intact b/l, facial strength intact without asymmetry b/l, hearing intact b/l, palate with symmetric elevation, trapezius 5/5 strength b/l, tongue midline on protrusion with full lateral movement    Motor - Normal bulk and tone throughout. No pronator drift.  Strength testing            Deltoid      Biceps      Triceps              R            5                 5               5                     5                               L             5                 5               5                     5                                       Hip Flexion        Knee Flexion    Knee Extension    Dorsiflexion    Plantar Flexion  R              5                           5                       5                           5                            5                      L              5                           5                        5                           5                            5                           Sensation - Light touch and temperature intact throughout    DTR's -             Biceps      Triceps     Brachioradialis      Patellar    Ankle    Toes/plantar response  R             2+             2+                  2+                       2+            2+                 Down  L              2+             2+                 2+                        2+           2+                 Down    Coordination - Finger to Nose intact b/l. No tremors appreciated normal heel to shin b/l     Gait and station - Normal casual gait. Romberg (+) normal toe and heel walker. normal tandem walk.    Labs:                        12.0   4.05  )-----------( 178      ( 22 May 2024 11:44 )             37.5     05-22    141  |  106  |  8   ----------------------------<  90  3.8   |  25  |  0.92    Ca    9.4      22 May 2024 11:44    TPro  6.7  /  Alb  3.9  /  TBili  0.7  /  DBili  x   /  AST  18  /  ALT  9<L>  /  AlkPhos  31<L>  05-22    CAPILLARY BLOOD GLUCOSE      POCT Blood Glucose.: 92 mg/dL (22 May 2024 10:42)    LIVER FUNCTIONS - ( 22 May 2024 11:44 )  Alb: 3.9 g/dL / Pro: 6.7 g/dL / ALK PHOS: 31 U/L / ALT: 9 U/L / AST: 18 U/L / GGT: x           Radiology:  CT Head No Cont:  (22 May 2024 12:09)  CTA H/N  AORTIC ARCH no dissection or aneurysmal dilatation is noted. Major vessel origins are patent. The subclavian arteries are well visualized and appear to be patent bilaterally.  COMMON CAROTID ARTERIES: Bilaterally patent with tortuosity  RIGHT BIFURCATION: Widely patent  LEFT BIFURCATION: Widely patent  INTERNAL CAROTID ARTERIES: Bilaterally patent with tortuosity  VERTEBRALS bilaterally patent with a a much larger right vertebral artery.  MISCELLANEOUS: Prevertebral soft tissues are unremarkable.    BRAIN both hemispheres appear to be symmetric with no CT evidence of an acute infarct, hemorrhage, or space-occupying lesion. No hydrocephalus or collections are noted.There is low position of the cerebellar tonsils indicative of a Chiari I malformation. Cerebellar tonsils reach C1, and there is diffuse effacement of the cisterna magna.    The petrous, cavernous, and supraclinoid carotid arteries are bilaterally patent  Both anterior cerebral arteries are patent. Both A1 segments are well formed.  Both middle cerebral arteries are patent. No M1 lesion or distal branch occlusion suggested.  Posterior cerebral arteries are patent.  Vertebrobasilar system is patent.  The venous sinuses are patent.  IMPRESSION:  1. The CT study demonstrates a Chiari I malformation with low cerebellar tonsils and diffuse effacement of the cisterna magna. The brain is otherwise unremarkable in appearance. Follow-up MR imaging of the brain and spine recommended for further assessment.  2. Widely patent head and neck vasculature, as outlined above.    MRI brain ww/o 6/29/2023  The ventricles and cortical sulci are within normal limits. There is no evidence of acute infarct, acute intracranial hemorrhage, mass, mass effect or midline shift. The basal cisterns are patent. There is no focus of abnormal parenchymal enhancement in the brain.  There is a small focus of susceptibility artifact in the right frontal lobe, which may represent a calcification or hemosiderin.  The major intracranial flow voids at the skull base are preserved. Near complete opacification of the left sphenoid sinus. The mastoid air cells are well aerated.  IMPRESSION:  Near-complete opacification of the left sphenoid sinus; otherwise, unremarkable study. No evidence of demyelinating lesions.    Thoracic spine ww/o 6/29/2023  There is no disc bulge or herniation. There is no spinal canal or foraminal narrowing.  FACET JOINTS: Normal  BONE MARROW: There is no bone marrow edema or fracture.  CORD: There is no cord signal abnormality or abnormal cord enhancement.  PARASPINAL MUSCLES/SOFT TISSUES: Normal  THORACIC AND ABDOMINAL SOFT TISSUES: Normal  IMPRESSION: No cord signal abnormality or abnormal cord enhancement.

## 2024-05-22 NOTE — CONSULT NOTE ADULT - ASSESSMENT
54M hx COPD, chronic sinusitis, neuroendocrine tumor of the lung, diagnosed 8/2023 s/p chemo and radiation, now on monthly immunotherapy p/f 1mo brain fog (episodic confusion) and outpt MR w/ newly diagnosed brain met. MR w/ large R parietal mass (~4.3x4.2x4.5cm) with heterogenous enhancement and surrounding edema new since 10/4/2023. Exam: AOx3, no facial, EOMI, very subtle L drift, GAMBINO 5/5, SILT.  - adm Dr. Hart 4C  - ?while pt is currently seemingly asymptomatic, mass is new from one year ago w/ large size and edema which will likely quickly progress   - ?pre-op for R crani for resection - no neurosurgical c/i to d/c  - f/u outpt w/ Dr. Keith

## 2024-05-22 NOTE — ED ADULT TRIAGE NOTE - CHIEF COMPLAINT QUOTE
numbness of tongue 9 am yesterday and numbness of left arm 7 pm last night, neck pain left side radiating to back of head  this morning numbness of tongue 9 am yesterday and numbness of left arm 7 pm last night, neck pain left side radiating to back of head  this morning.  Fingerstick in ED 92 mg/dl

## 2024-05-22 NOTE — ED PROVIDER NOTE - CARE PROVIDER_API CALL
Giles Keith  [address] [address]  Phone: [phone]  Fax: [phone]  Follow Up Time:

## 2024-05-22 NOTE — ED PROVIDER NOTE - PROGRESS NOTE DETAILS
JAMEL Horton: spoke to Dr Bansal from neurology who reviewed outpatient MRI images and chiari malformation was present at that time but not dictated in report. Neuro recommended outpatient f/u and neurosurg to c/s JAMEL Horton: seen by neurosurg resident Tami who recommended outpatient f/u

## 2024-05-22 NOTE — CONSULT NOTE ADULT - ASSESSMENT
28y (1995) woman who works as a  with a pmhx significant for occasional migraines presenting with acute left sided tongue and arm numbness as well as a left sided headache. Pt noticed a patch of the left side of her tongue was numb while eating breakfast Monday morning. Tuesday night she noticed that the back of her arm up to her wrist was numb. This morning she developed a left sided headache. She reports that her symptoms are constant and that nothing makes them better or worse. She has felt something similar to this before. About 1 year ago pt had intermittent leg numbness that lasted for several months then spontaneously resolved. She was saw outpatient neurologist, Dr. Jose Verdugo, and underwent MRIs and EMG. MRI brain and thoracic spine ww/o showed no demyelinating lesions or abnormal enhancement. EMG of LE were normal. Pt reports no changes in diet and is not taking any medications. She does take vitamin D and B12 supplements as well as stool softener (Colace) as needed. She does not report any trauma, sick contacts, changes in vision, vomiting, nausea or changes in motor function of her arm. Neuro exam normal and unremarkable. CTH today showing chiari I malformation that was not verbalized on MRI brain though it was likely present. Imaging reviewed with attending.     Impression: left tongue and posterior left arm to level of wrist decreased sensation 2/2 migraine with sensory symptoms vs less likely demyelinating process vs side effect of possible supplements    Plan  NSGY to comment on CTH  F/u with Dr. Joes Verdugo at [address] [place] [address] [phone]  no further inpatient neurological work-up required at this time    Case seen and discussed with Neurology Attending

## 2024-05-22 NOTE — ED ADULT NURSE NOTE - ED STAT RN HANDOFF DETAILS
1430 Report given to receiving change of shift ELOISA COTE  patient is in no acute distress. Patient vital signs stable, plan of care explained.

## 2024-05-22 NOTE — ED PROVIDER NOTE - ATTENDING APP SHARED VISIT CONTRIBUTION OF CARE
Patient is a 28-year-old female with no chronic medical problems here for evaluation of left-sided tongue numbness since 9 AM, left posterior arm numbness since last night 7 PM.  Patient also states that she is twitching in her face.  She denies any headache, vision changes, focal weakness.  She denies any changes in speech or gait.  No fevers, chills, nausea, vomiting.  No chest pain or shortness of breath.    VS noted  Gen. no acute distress, Non toxic   HEENT:  PERRLA, EOMI, mmm  Spine: No midline C–T–L-spine tenderness  Lungs: CTAB/L no C/ W /R   CVS: RRR   Abd; Soft non tender, non distended   Ext: no edema  Skin: no rash  Neuro AAOx3,  CN II-XII intact, strength 5/5 in UE/LE, sensation intact bilaterally, clear speech  a/p:  left arm numbness/tingling and left tongue numbness.  Upon neuroexam, patient reports she can feel it but she has a persistent tingling in her left arm.  The rest of her neuroexam is completely normal.  Plan for CT imaging, labs.  Concern for metabolic derangement such as electrolyte abnormalities given she is also complaining of twitching.  She has no headache or vision changes.  - Eamon BOBBY

## 2024-05-22 NOTE — ED PROVIDER NOTE - NSFOLLOWUPINSTRUCTIONS_ED_ALL_ED_FT
Follow up with your Primary Care Physician within the next 2-3 days  Follow up with neurology [phone]  Follow up neurosurgery Dr Keith   [address] [place] [id number]  Phone: [phone]    Bring a copy of your test results with you to your appointment  Continue your current medication regimen  Return to the Emergency Room if you experience new or worsening symptoms abdominal pain, nausea, vomiting, fever chills, cough, chest pain, shortness of breath, dizziness, slurred speech, weakness, gait abnormality

## 2024-05-25 ENCOUNTER — EMERGENCY (EMERGENCY)
Facility: HOSPITAL | Age: 29
LOS: 1 days | Discharge: HOME | End: 2024-05-25
Attending: PERSONAL EMERGENCY RESPONSE ATTENDANT
Payer: COMMERCIAL

## 2024-05-25 VITALS
OXYGEN SATURATION: 97 % | TEMPERATURE: 98 F | SYSTOLIC BLOOD PRESSURE: 143 MMHG | DIASTOLIC BLOOD PRESSURE: 103 MMHG | RESPIRATION RATE: 16 BRPM | HEART RATE: 67 BPM

## 2024-05-25 VITALS
TEMPERATURE: 99 F | HEART RATE: 57 BPM | SYSTOLIC BLOOD PRESSURE: 159 MMHG | HEIGHT: 68 IN | OXYGEN SATURATION: 100 % | RESPIRATION RATE: 18 BRPM | DIASTOLIC BLOOD PRESSURE: 100 MMHG

## 2024-05-25 LAB
ADD ON TEST-SPECIMEN IN LAB: SIGNIFICANT CHANGE UP
ALBUMIN SERPL ELPH-MCNC: 4.7 G/DL — SIGNIFICANT CHANGE UP (ref 3.3–5)
ALP SERPL-CCNC: 35 U/L — LOW (ref 40–120)
ALT FLD-CCNC: 11 U/L — SIGNIFICANT CHANGE UP (ref 10–45)
ANION GAP SERPL CALC-SCNC: 10 MMOL/L — SIGNIFICANT CHANGE UP (ref 5–17)
APTT BLD: 28.9 SEC — SIGNIFICANT CHANGE UP (ref 24.5–35.6)
AST SERPL-CCNC: 17 U/L — SIGNIFICANT CHANGE UP (ref 10–40)
BASOPHILS # BLD AUTO: 0.03 K/UL — SIGNIFICANT CHANGE UP (ref 0–0.2)
BASOPHILS NFR BLD AUTO: 0.7 % — SIGNIFICANT CHANGE UP (ref 0–2)
BILIRUB SERPL-MCNC: 0.6 MG/DL — SIGNIFICANT CHANGE UP (ref 0.2–1.2)
BUN SERPL-MCNC: 11 MG/DL — SIGNIFICANT CHANGE UP (ref 7–23)
CALCIUM SERPL-MCNC: 9.9 MG/DL — SIGNIFICANT CHANGE UP (ref 8.4–10.5)
CHLORIDE SERPL-SCNC: 108 MMOL/L — SIGNIFICANT CHANGE UP (ref 96–108)
CO2 SERPL-SCNC: 25 MMOL/L — SIGNIFICANT CHANGE UP (ref 22–31)
CREAT SERPL-MCNC: 0.99 MG/DL — SIGNIFICANT CHANGE UP (ref 0.5–1.3)
EGFR: 79 ML/MIN/1.73M2 — SIGNIFICANT CHANGE UP
EOSINOPHIL # BLD AUTO: 0.17 K/UL — SIGNIFICANT CHANGE UP (ref 0–0.5)
EOSINOPHIL NFR BLD AUTO: 3.7 % — SIGNIFICANT CHANGE UP (ref 0–6)
GLUCOSE SERPL-MCNC: 85 MG/DL — SIGNIFICANT CHANGE UP (ref 70–99)
HCT VFR BLD CALC: 41.8 % — SIGNIFICANT CHANGE UP (ref 34.5–45)
HGB BLD-MCNC: 13.9 G/DL — SIGNIFICANT CHANGE UP (ref 11.5–15.5)
IMM GRANULOCYTES NFR BLD AUTO: 0.2 % — SIGNIFICANT CHANGE UP (ref 0–0.9)
INR BLD: 0.98 RATIO — SIGNIFICANT CHANGE UP (ref 0.85–1.18)
LYMPHOCYTES # BLD AUTO: 1.61 K/UL — SIGNIFICANT CHANGE UP (ref 1–3.3)
LYMPHOCYTES # BLD AUTO: 35.2 % — SIGNIFICANT CHANGE UP (ref 13–44)
MCHC RBC-ENTMCNC: 30 PG — SIGNIFICANT CHANGE UP (ref 27–34)
MCHC RBC-ENTMCNC: 33.3 GM/DL — SIGNIFICANT CHANGE UP (ref 32–36)
MCV RBC AUTO: 90.1 FL — SIGNIFICANT CHANGE UP (ref 80–100)
MONOCYTES # BLD AUTO: 0.37 K/UL — SIGNIFICANT CHANGE UP (ref 0–0.9)
MONOCYTES NFR BLD AUTO: 8.1 % — SIGNIFICANT CHANGE UP (ref 2–14)
NEUTROPHILS # BLD AUTO: 2.39 K/UL — SIGNIFICANT CHANGE UP (ref 1.8–7.4)
NEUTROPHILS NFR BLD AUTO: 52.1 % — SIGNIFICANT CHANGE UP (ref 43–77)
NRBC # BLD: 0 /100 WBCS — SIGNIFICANT CHANGE UP (ref 0–0)
PLATELET # BLD AUTO: 200 K/UL — SIGNIFICANT CHANGE UP (ref 150–400)
POTASSIUM SERPL-MCNC: 3.9 MMOL/L — SIGNIFICANT CHANGE UP (ref 3.5–5.3)
POTASSIUM SERPL-SCNC: 3.9 MMOL/L — SIGNIFICANT CHANGE UP (ref 3.5–5.3)
PROT SERPL-MCNC: 7.9 G/DL — SIGNIFICANT CHANGE UP (ref 6–8.3)
PROTHROM AB SERPL-ACNC: 10.8 SEC — SIGNIFICANT CHANGE UP (ref 9.5–13)
RBC # BLD: 4.64 M/UL — SIGNIFICANT CHANGE UP (ref 3.8–5.2)
RBC # FLD: 13.6 % — SIGNIFICANT CHANGE UP (ref 10.3–14.5)
SODIUM SERPL-SCNC: 143 MMOL/L — SIGNIFICANT CHANGE UP (ref 135–145)
TROPONIN T, HIGH SENSITIVITY RESULT: <6 NG/L — SIGNIFICANT CHANGE UP (ref 0–51)
WBC # BLD: 4.58 K/UL — SIGNIFICANT CHANGE UP (ref 3.8–10.5)
WBC # FLD AUTO: 4.58 K/UL — SIGNIFICANT CHANGE UP (ref 3.8–10.5)

## 2024-05-25 PROCEDURE — 83605 ASSAY OF LACTIC ACID: CPT

## 2024-05-25 PROCEDURE — 86140 C-REACTIVE PROTEIN: CPT

## 2024-05-25 PROCEDURE — 83036 HEMOGLOBIN GLYCOSYLATED A1C: CPT

## 2024-05-25 PROCEDURE — 82803 BLOOD GASES ANY COMBINATION: CPT

## 2024-05-25 PROCEDURE — 93005 ELECTROCARDIOGRAM TRACING: CPT

## 2024-05-25 PROCEDURE — 70496 CT ANGIOGRAPHY HEAD: CPT | Mod: MC

## 2024-05-25 PROCEDURE — 36415 COLL VENOUS BLD VENIPUNCTURE: CPT

## 2024-05-25 PROCEDURE — 99291 CRITICAL CARE FIRST HOUR: CPT

## 2024-05-25 PROCEDURE — 85610 PROTHROMBIN TIME: CPT

## 2024-05-25 PROCEDURE — 70450 CT HEAD/BRAIN W/O DYE: CPT | Mod: 26,59,MC

## 2024-05-25 PROCEDURE — 82947 ASSAY GLUCOSE BLOOD QUANT: CPT

## 2024-05-25 PROCEDURE — 85014 HEMATOCRIT: CPT

## 2024-05-25 PROCEDURE — 70450 CT HEAD/BRAIN W/O DYE: CPT | Mod: MC

## 2024-05-25 PROCEDURE — 85018 HEMOGLOBIN: CPT

## 2024-05-25 PROCEDURE — 84484 ASSAY OF TROPONIN QUANT: CPT

## 2024-05-25 PROCEDURE — 70496 CT ANGIOGRAPHY HEAD: CPT | Mod: 26,MC

## 2024-05-25 PROCEDURE — 70498 CT ANGIOGRAPHY NECK: CPT | Mod: MC

## 2024-05-25 PROCEDURE — 70498 CT ANGIOGRAPHY NECK: CPT | Mod: 26,MC

## 2024-05-25 PROCEDURE — 99291 CRITICAL CARE FIRST HOUR: CPT | Mod: 25

## 2024-05-25 PROCEDURE — 85730 THROMBOPLASTIN TIME PARTIAL: CPT

## 2024-05-25 PROCEDURE — 85025 COMPLETE CBC W/AUTO DIFF WBC: CPT

## 2024-05-25 PROCEDURE — 84132 ASSAY OF SERUM POTASSIUM: CPT

## 2024-05-25 PROCEDURE — 82962 GLUCOSE BLOOD TEST: CPT

## 2024-05-25 PROCEDURE — 85652 RBC SED RATE AUTOMATED: CPT

## 2024-05-25 PROCEDURE — 82330 ASSAY OF CALCIUM: CPT

## 2024-05-25 PROCEDURE — 84295 ASSAY OF SERUM SODIUM: CPT

## 2024-05-25 PROCEDURE — 80053 COMPREHEN METABOLIC PANEL: CPT

## 2024-05-25 PROCEDURE — 82435 ASSAY OF BLOOD CHLORIDE: CPT

## 2024-05-25 RX ORDER — VALACYCLOVIR 500 MG/1
1000 TABLET, FILM COATED ORAL ONCE
Refills: 0 | Status: COMPLETED | OUTPATIENT
Start: 2024-05-25 | End: 2024-05-25

## 2024-05-25 RX ORDER — VALACYCLOVIR 500 MG/1
1 TABLET, FILM COATED ORAL
Qty: 21 | Refills: 0
Start: 2024-05-25 | End: 2024-05-31

## 2024-05-25 RX ADMIN — VALACYCLOVIR 1000 MILLIGRAM(S): 500 TABLET, FILM COATED ORAL at 15:48

## 2024-05-25 RX ADMIN — Medication 1 APPLICATION(S): at 16:23

## 2024-05-25 RX ADMIN — Medication 1 DROP(S): at 15:48

## 2024-05-25 RX ADMIN — Medication 60 MILLIGRAM(S): at 15:49

## 2024-05-25 NOTE — ED PROVIDER NOTE - OBJECTIVE STATEMENT
Attending MD German.  Pt is a 27 yo fem with pmhx of budd-chiari who presents to ED with complaint of L facial droop including L lid and forehead beginning yesterday acutely at 1400.  Pt endorses mild L neck discomfort.  Pt with marked L facial droop on arrival to ED.  Stroke code called in triage.  Pt assessed with isolated L facial droop without additional findings on full CNII-XII, extremity strength.  Pt well appearing.  On further discussion pt endorses L tongue numbness that began on Tuesday with some taste changes and some L posterior bicep numbness/sensory change.  Pt notes that father at home currently has shingles.

## 2024-05-25 NOTE — STROKE CODE NOTE - ACTIVATED STROKE TEAM
Pt complaining of cough and inability to cough up secretions, PA made aware.
Report received from JACEY Mack and JACEY Henderson. Upon reassessment pt resting quietly on stretcher with duo-neb going. Pt in no respiratory distress, no accessory muscle use. Safety and comfort measures provided and maintained, bed locked and in lowest position, side rails up for safety.
Yes

## 2024-05-25 NOTE — CONSULT NOTE ADULT - ASSESSMENT
ASSESSMENT   28-year-old woman history of chiari 1 and migraines presenting as code stroke for L facial droop.  Patient reports several days prior presented to ED for L numbnes in tongue and also l side of body, was seen by neurology at that time and recommended for outpatient workup with Dr. Verdugo  Patient reports day prior to presentation noted to have L facial droop and forehead decreased wrinkling, also with L eye not fully closing.  Father has shingles this week  not on any meds  NIHSS 1  MRS 0  LKW 1400 5/24/24    IMPRESSION   L facial droop, L tongue numbness, L forehead involvement likely peripheral etiology also i/s/o family member with shingles this week.    RECOMMENDATION     For bell's palsy  [] Prednisone 60MG PO daily x 7days + Valtrex 1G PO TID x 7 days.  [] Eye patch (especially at night) to prevent corneal scratching, OTC natural tears for dry eye.  [] A1c, basic set of labs, ESR, CRP; can follow-up as outpatient  [] would likely benefit from outpatient MRI brain and c-spine w/ and w/o but does not have to stay for this  [] she can follow-up with outpatient [facility] neurologist Dr. Jose Verdugo ([phone])    Discussed with on call stroke fellow Dr. Shaw under supervision of Dr. Ross stroke attending.  Note finalized upon attestation.

## 2024-05-25 NOTE — ED ADULT NURSE NOTE - NS ED PATIENT SAFETY CONCERN
SOCIAL WORK NOTE    Collateral was obtained from Mom and maternal Grandmother    Pt is 10 yr old male domiciled with Mom and 7 year old sister. Pt attends special education 8:1:1 classroom at   &th grade with good academic performance. Pt has hx of Autism. receives outpt weekly therapy - No prior inpatient care -No medication trials Was referred to ED after making a SI statement in school    As per Mom, She missed a phone call from school and called back in 45 minutes however school indicated 911 was called to bring pt to the ED - despite Mom requesting them to wait for her to arrive - School stated that they had safety concerns    As per Mom. pt is new to the school and they don't know how to manage him. Adding that when he is upset he perseverates on things especially in the past. Mom stated that school is aware that pt and a classmate have been having issues This boy had been in pt's cotrney school As per Mom the school requested permission to have some sessions together with pt and the classmate. Mom was in agreement. She reports that today they met and pt was feeling pressured to speak in the meeting and became upset.     Mom denied any hx of aggression toward others. Stated when he is upset he often will bite his hand. and Needs time to settle and regroup. While in the ED pt told Mom that he was feeling pressured to speak and did not want to but they forced him to.     Mom denied having any safety concerns  At home pt has been at baseline with ADL's sleep and appetite. Pt enjoys video games and does socialize with peers online.   She described pt as very sensitive and perseverative - Mom stated they have an solid open relationship Additionally Pt has a ongoing weekly contact with bio dad - Mom stated that try to co parent together.     Discussed with Mom that pt related to ED staff that she was upset over what happened with his sister and learning about SEX on the computer. Approached Mom with the concern - She reported hen pt was 9 she walked In on the pt with the sister in the bedroom - both were fully dressed however Mom felt that something was happening while she was cooking. Mom, pt and the sister addressed the concerns at length  with pt's therapist -and since Mom has continued to teach pt about sex.    Mom stated pt has a good relationship with therapist at [facility] Eun Moe [phone]- Pt had a psych eval and currently not prescribed any medications. Mom state they have been considering it but pt has been making progress so they decision was to hold off.  Attempted to contact therapist - Message was left. denied any ACS involvement.    Family hx - Mom reports that she suffers form anxiety and Bio Dad has hx of anger issues. No hx of SI or substance abuse  - No access to guns or weapons.     Denied any any legal involvement, No running away. Denied cruelty to animals or fire setting.    pt was evaluated and currently not in need of admission . Pt has therapy session scheduled for later today
No

## 2024-05-25 NOTE — ED PROVIDER NOTE - ATTENDING CONTRIBUTION TO CARE
Attending MD German:  I performed a history and physical exam of the patient and discussed their management with the resident. I reviewed the resident's note and agree with the documented findings and plan of care. My medical decision making and observations are found above.    Critical care billing:  Upon my evaluation, this patient had a high probability of imminent or life-threatening deterioration due to stroke-like symptoms, which required my direct attention, intervention, and personal management.  The patient has a  medical condition that impairs one or more vital organ systems.  Frequent personal assessment and adjustment of medical interventions was performed.      I have personally provided 45 minutes of critical care time exclusive of time spent on separately billable procedures. Time includes review of laboratory data, radiology results, discussion with consultants, patient and family; monitoring for potential decompensation, as well as time spent retrieving data and reviewing the chart and documenting the visit. Interventions were performed as documented above.

## 2024-05-25 NOTE — ED ADULT NURSE NOTE - BEFAST SPEECH SLURRED
PF DAILY TREATMENT NOTE 3-16    Patient Name: Jackelyn Severance  Date:2022  : 1948  [x]  Patient  Verified  Payor: Cesar Bone / Plan: [place] PART [place] & B / Product Type: Medicare /    In time: 12:49  Out time: 1:30  Total Treatment Time (min): 41  Visit #: 2 of 8    Medicare/BCBS Only   Total Timed Codes (min):  41 1:1 Treatment Time:  41     Treatment Area: [x] Pelvic Floor     [] Other:    SUBJECTIVE  Pain Level (0-10 scale): Any medication changes, allergies to medications, adverse drug reactions, diagnosis change, or new procedure performed?: [x] No    [] Yes (see summary sheet for update)  Subjective functional status/changes:   [] No changes reported  Pt reports good BM, only taking Miralax every other day. She's using NMES every other day. She saw pain management recently. She cont to experience pressure/urgency with rectal region. OBJECTIVE      15 min Therapeutic Exercise:  [] See flow sheet :   []  Pelvic floor strengthening                 []  Pelvic floor downtraining  []  Quality pelvic floor contractions       [x]  Relaxation techniques  []  Urge suppression exercises  []  Other:  Rationale: Decrease resting tone of the pelvic floor, Increase tissue extensibility of the pelvic floor muscles, Inhibit abnormal muscle activity and Improve lumbosacral and coccygeal mobility in order to Increase urinary continence, Decrease urinary urgency, Increase ability to delay urination, Decrease frequency of urination, Decrease nocturia and Improve ability to perform ADLs.        16 min Therapeutic Activity:  []  See flow sheet :    []  Increase Tissue extensibility        []  Assess fiber intake    [x]  Assess voiding habits  [x]  Assess bowel habits  []  Other:    Rationale: Increase pelvic floor muscle strength, Improve quality of pelvic floor contractions, Decrease resting tone of the pelvic floor, Increase tissue extensibility of the pelvic floor muscles, Increase core strength, Inhibit No abnormal muscle activity and Improve lumbosacral and coccygeal mobility in order to Increase urinary continence, Decrease urinary urgency, Increase ability to delay urination, Decrease frequency of urination, Decrease nocturia and Improve ability to perform ADLs. 10 min Manual Therapy:  PFM MFR   The manual therapy interventions were performed at a separate and distinct time from the therapeutic activities interventions. Rationale: Decrease resting tone of the pelvic floor, Increase tissue extensibility of the pelvic floor muscles, Inhibit abnormal muscle activity and Improve lumbosacral and coccygeal mobility in order to Increase urinary continence, Decrease urinary urgency, Increase ability to delay urination, Decrease frequency of urination, Decrease nocturia and Improve ability to perform ADLs. With   [] TE   [] TA   [] neuro  [] manual   [] other: Patient Education: [x] Review HEP    [] Progressed/Changed HEP based on:   [] positioning   [] body mechanics   [] transfers   [] heat/ice application    [] other:      Other Objective/Functional Measures:   []baseline resting tone:   []slow twitch mms   []fast twitch mms    Pain Level (0-10 scale) post treatment: 0/10    ASSESSMENT/Changes in Function: Pt reports significant improvement of urgency/pressure at rectal region after intra-rectal MFR. She present with mod tone but no trigger points of PFM. Noted min improvement of strength. Will cont with manual and progress therex as tolerated.      []  Decrease # of leaks   [] No change []  Improving [] Resolved     []  Decrease hypertonus [] No change []  Improving [] Resolved     []  Increase void interval [] No change []  Improving [] Resolved     []  Increase PF strength [] No change []  Improving [] Resolved     []  Increase PF endurance [] No change []  Improving [] Resolved     []  Increase endurance [] No change []  Improving [] Resolved     []  Decrease # of pads [] No change []  Improving [] Resolved     []  Decrease pain [] No change []  Improving [] Resolved     []  Increased coordination [] No change []  Improving [] Resolved     []  Increased Bowel Frequency [] No change []  Improving [] Resolved       Patient will continue to benefit from skilled PT services to modify and progress therapeutic interventions, address functional mobility deficits, address ROM deficits, address strength deficits, analyze and address soft tissue restrictions, analyze and cue movement patterns, analyze and modify body mechanics/ergonomics, assess and modify postural abnormalities, address imbalance/dizziness and instruct in home and community integration to attain remaining goals. []  See Plan of Care  [x]  See progress note/recertification  []  See Discharge Summary         Progress towards goals / Updated goals:  Goals for this certification period to be accomplished in 4 weeks:  1. Patient will demonstrate independence in HEP for maintenance of pelvic floor program and improved quality of life. Status at Progress Note: fair compliance 4-28-22  Current: fair compliance 5-13-22     2. Patient will have decreased leakage episodes to </=5 day per week for increased quality of life. Status at Progress Note: initial assessment: 4x/day; 1 episodes during the last 1 week 4-28-22  Current: 1x during the last 2 weeks 5-13-22     3. Patient will report at least 75% improvement with complete empty of bowel & bladder to improve her QOL. Status at Progress Note: progressing well, 50% overall 4-28-22   Current: improving min 5-18-22     4. Patient will have increased pelvic floor muscle motor performance by 1/2 to 1 grade for improved urinary continence and quality of life. Status at Progress Note: poor progression due to gap with HD 8-79-33  Current: 1+/5 5-18-22     5.  Patient will have FOTO Urinary Problem & Bowel Constipation score change of 13 points or more indicating improvement in function for icreased quality of life.  Eval status: initial assessment: 41 & 47; no significant change 4-28-22    PLAN  [x]  Upgrade activities as tolerated     [x]  Continue plan of care  []  Update interventions per flow sheet       []  Discharge due to:_  []  Other:_      Layla Cooley 5/18/2022  8:56 AM    Future Appointments   Date Time Provider Gaby Delarosa   5/18/2022 12:45 PM Aureliano Chacon [id number] [facility]   5/25/2022 11:15 AM Aureliano Chacon [id number] [facility]   5/27/2022  1:30 PM Ciara Arnold NP [facility]   6/1/2022 11:15 AM Aureliano Chacon [id number] [facility]   6/2/2022 11:00 AM JULIA Miller [facility]

## 2024-05-25 NOTE — ED PROVIDER NOTE - CLINICAL SUMMARY MEDICAL DECISION MAKING FREE TEXT BOX
** Initial documentation completed by Dr. German.  Pt seen and managed by myself and resident physician Dr. Godoy.  Attending MD German.  Pt well appearing with clinical evidence of Bell's palsy.  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

## 2024-05-25 NOTE — ED PROVIDER NOTE - PATIENT PORTAL LINK FT
You can access the FollowMyHealth Patient Portal offered by [facility] by registering at the following website: [email or web address]. By joining [employer]s FollowMyHealth portal, you will also be able to view your health information using other applications (apps) compatible with our system.

## 2024-05-25 NOTE — ED ADULT TRIAGE NOTE - CODE STROKE ACTIVE YN
How Severe Are Your Spot(S)?: mild What Type Of Note Output Would You Prefer (Optional)?: Bullet Format What Is The Reason For Today's Visit?: Full Body Skin Examination What Is The Reason For Today's Visit? (Being Monitored For X): concerning skin lesions on an annual basis Yes

## 2024-05-25 NOTE — ED PROVIDER NOTE - PROGRESS NOTE DETAILS
Attending MD German.  Of note, pt does not have Budd Chiari, rather a noted chiari malformation.  Neurology following, will follow recs. Tamy Godoy M.D. (Resident Physician): Pt feeling okay. Followed-up neuro recs. Will dc with ophtho and neuro f/u, prednisone, valacyclovir, artificial tears, and ophthalmic ointment. Attending MD Bernabe: Patient re-evaluated and feeling improved.  No acute issues at  this time.  Lab and radiology tests reviewed with patient and loved one.  Patient stable for discharge. Follow up instructions given, importance of follow up emphasized, return to ED parameters reviewed and patient verbalized understanding.  All questions answered, all concerns addressed.

## 2024-05-25 NOTE — ED PROVIDER NOTE - NSFOLLOWUPCLINICS_GEN_ALL_ED_FT
[facility]  Ophthalmology  [address] [place] [address] [address]  [place] [address]  Phone: [phone]  Fax:   Follow Up Time: 1-3 Days

## 2024-05-25 NOTE — ED PROVIDER NOTE - NSFOLLOWUPINSTRUCTIONS_ED_ALL_ED_FT
You have been evaluated in the Emergency Department today for a left facial droop and were found to have symptoms consistent with left-sided Bell's Palsy.    Please  the Prednisone and Valacyclovir and take as prescribed.    Use the artificial tears multiple times in the left eye per day to keep your eye moist. Before going to bed or taking a nap, please put the ointment in the left eye.     Please call neurologist Dr. Jose Verdugo ([phone]) on Tuesday to schedule an appointment.    The Hospital will call you in a couple days to schedule an appointment with a Ophthalmologist.    Return to the Emergency Department if you experience blurry vision, weakness, numbness/tingling, fevers 100.4°F or greater, recurrent vomiting, or any other concerning symptoms.    Thank you for choosing us for your care. You have been evaluated in the Emergency Department today for a left facial droop and were found to have symptoms consistent with left-sided Bell's Palsy.    Please  the Prednisone and Valacyclovir and take as prescribed.    Use the artificial tears multiple times in the left eye per day to keep your eye moist. Before going to bed or taking a nap, please put the ointment in the left eye.     For symptomatic relief, you may use over the counter artificial tears such as Systane, TheraTears, Celluvisc, Refresh (viscous eye drops) or eye ointment such as GenTeal Gel, Systane Gel, Refresh PM.  Please avoid artificial tears/products that are meant to "get the red out" such as Visine or ClearEyes.     Please call neurologist Dr. Jose Verdugo ([phone]) on Tuesday to schedule an appointment.    The Hospital will call you in a couple days to schedule an appointment with a Ophthalmologist.  Please try calling on Tuesday, to setup an ophthalmology appointment.    While here, your blood pressure has been persistently elevated, please follow up with your primary care doctor in regards to this matter in the next 1-3 days.    Return to the Emergency Department if you experience blurry vision, weakness, numbness/tingling, fevers 100.4°F or greater, recurrent vomiting, or any other concerning symptoms.    Thank you for choosing us for your care.

## 2024-05-25 NOTE — ED PROVIDER NOTE - SHIFT CHANGE DETAILS
Attending MD Bernabe: 28F w bells palsy, initially had L bicep numbness and L tongue numbness, taste change, yesterday developed facial droop, has zoster exposure at home, now pending neuro, dispo as per neuro, Rx acyclovir/prednisone

## 2024-05-25 NOTE — CONSULT NOTE ADULT - SUBJECTIVE AND OBJECTIVE BOX
Neurology - Consult Note    Spectra: [phone] ([facility]), [phone] ([facility])    28-year-old woman history of chiari 1 and migraines presenting as code stroke for L facial droop.  Patient reports several days prior presented to ED for L numbnes in tongue and also l side of body, was seen by neurology at that time and recommended for outpatient workup with Dr. Verdugo  Patient reports day prior to presentation noted to have L facial droop and forehead decreased wrinkling, also with L eye not fully closing.  Father has shingles this week  not on any meds  NIHSS 1  MRS 0  LKW 1400 5/24/24    Review of Systems:   NEUROLOGICAL: +As stated in HPI above  All other review of systems is negative unless indicated above.    Allergies:  No Known Allergies      PMHx/PSHx/Family Hx: As above, otherwise see below       Social Hx:  as above    Vitals:  T(C): 36.8 (05-25-24 @ 14:10), Max: 37.1 (05-25-24 @ 13:08)  HR: 59 (05-25-24 @ 14:10) (57 - 69)  BP: 161/101 (05-25-24 @ 14:10) (159/100 - 161/101)  RR: 16 (05-25-24 @ 14:10) (16 - 18)  SpO2: 97% (05-25-24 @ 14:10) (97% - 100%)    Physical Examination:   General - non-toxic appearing female in no acute distress  Neurologic Exam:  Mental status - Awake, Alert, Oriented to person, place, and time. Speech fluent, repetition and naming intact. Follows simple and complex commands.  Cranial nerves - PERRLA (4mm -> 3mm b/l), VFF, EOMI, face sensation (V1-V3) intact b/l, dec forehead wrinlking L, decreased nasolabial flattening L, L eye does not close fully sternocleidomastiod 5/5 strength b/l, tongue midline on protrusion with full lateral movement  Motor - Normal bulk and tone throughout. No pronator drift.  Strength testing 5/5 b/l UE LE  Sensation - Light touch intact throughout  DTR's - deferred  Coordination - Finger to Nose intact b/l. No tremors appreciated  Gait and station - Normal casual gait.    Labs:                        13.9   4.58  )-----------( 200      ( 25 May 2024 13:21 )             41.8     05-25    143  |  108  |  11  ----------------------------<  85  3.9   |  25  |  0.99    Ca    9.9      25 May 2024 13:21    TPro  7.9  /  Alb  4.7  /  TBili  0.6  /  DBili  x   /  AST  17  /  ALT  11  /  AlkPhos  35<L>  05-25    CAPILLARY BLOOD GLUCOSE      POCT Blood Glucose.: 77 mg/dL (25 May 2024 13:11)    LIVER FUNCTIONS - ( 25 May 2024 13:21 )  Alb: 4.7 g/dL / Pro: 7.9 g/dL / ALK PHOS: 35 U/L / ALT: 11 U/L / AST: 17 U/L / GGT: x             PT/INR - ( 25 May 2024 13:21 )   PT: 10.8 sec;   INR: 0.98 ratio         PTT - ( 25 May 2024 13:21 )  PTT:28.9 sec    Radiology:  CT Head No Cont:  (22 May 2024 12:09)  1.  No acute hemorrhage, mass effect, or midline shift.  2.  Stable ectopic cerebellar tonsils.    CT angio  1.  No vessel occlusion or significant stenosis.  2.  Presumed right PCOM infundibulum.

## 2024-05-25 NOTE — ED ADULT NURSE NOTE - OBJECTIVE STATEMENT
Pt is 28y F with PMH Chiari I Malformation complaining of facial droop. Pt reports worsening L facial droop since 1400 yesterday, noticing asymmetry in smile and inability to fully lower L eyelid. Reports decreased taste and L sided tongue numbness x 2 days. Reports father recently diagnosed with shingles. Denies fevers, chills. Code stroke called 1308. FS 77. Upon assessment, A&Ox4, denies chest pain, SOB, abdominal pain, n/v, changes in vision. No drift or decreased sensation in all extremities. L facial droop. Transported to CT.

## 2024-05-29 ENCOUNTER — NON-APPOINTMENT (OUTPATIENT)
Age: 29
End: 2024-05-29

## 2024-05-29 ENCOUNTER — APPOINTMENT (OUTPATIENT)
Dept: OPHTHALMOLOGY | Facility: CLINIC | Age: 29
End: 2024-05-29
Payer: MEDICAID

## 2024-05-29 PROCEDURE — 92004 COMPRE OPH EXAM NEW PT 1/>: CPT

## 2024-06-03 ENCOUNTER — APPOINTMENT (OUTPATIENT)
Dept: NEUROLOGY | Facility: CLINIC | Age: 29
End: 2024-06-03
Payer: COMMERCIAL

## 2024-06-03 VITALS
SYSTOLIC BLOOD PRESSURE: 150 MMHG | BODY MASS INDEX: 28.04 KG/M2 | DIASTOLIC BLOOD PRESSURE: 93 MMHG | HEART RATE: 79 BPM | WEIGHT: 185 LBS | HEIGHT: 68 IN

## 2024-06-03 DIAGNOSIS — G51.0 BELL'S PALSY: ICD-10-CM

## 2024-06-03 DIAGNOSIS — R20.2 PARESTHESIA OF SKIN: ICD-10-CM

## 2024-06-03 PROCEDURE — 99495 TRANSJ CARE MGMT MOD F2F 14D: CPT

## 2024-06-03 PROCEDURE — 99215 OFFICE O/P EST HI 40 MIN: CPT

## 2024-06-03 PROCEDURE — G2211 COMPLEX E/M VISIT ADD ON: CPT | Mod: NC

## 2024-06-03 NOTE — HISTORY OF PRESENT ILLNESS
[FreeTextEntry1] : 28-year-old woman who is here for initial consultation of numbness and tingling along the thighs to her feet.  Patient states that it was intermittent in January and starting this week it became more constant.  Patient states that sometimes it extends to her heels but sometimes it also involves the toes.  Patient denies any triggers such as trauma or medication changes.  Patient denies any urinary or fecal incontinence and there is no perineal anesthesia.  Patient denies any sexual dysfunction.  Patient does have a history of back pain for the past 9 years after the birth of her child however straight leg raise was negative bilaterally on exam today.  Patient denies any diarrhea or fevers or URI symptoms. Patient does not have any history of other neurologic symptoms.  Interval history May 4, 2023: Patient had MRI of the lumbar spine with and without contrast which was negative for any demyelinating lesions or radiculopathy.  Patient was prescribed gabapentin but she never took it.  Patient states that the tingling is still there from her anterior thighs to her feet.  Patient states that she experiences it more so at night.  Mother reveals that her brother at the age of 15 had similar symptoms on the right leg with spine curvature and he was noted to have an osteoid osteoma.  Mother states that they were not able to detect this lesion on MRIs but it was found on CT scans.    Interval history May 23, 2023: Since the last visit patient had a CT of the lumbar spine as per request by mother to not miss the osteoid osteoma.  Patient had no disc herniations or other abnormal findings.  Patient continues to have symptoms in her bilateral thighs with no symptoms in her arms.  Patient wishes to obtain further imaging of the thoracic and head to rule out demyelinating lesion.  Given her age we will proceed with that.  Interval history Chantelle 3, 2024: Patient's nerve conduction and EMG studies, MRI of the brain and C-spine and lumbar spine were unremarkable.  Patient had left-sided Bell's palsy on May 25 and went to the ER she is status post prednisone and Valtrex.  Patient still experience some difficulty with taste on the left side of the tongue.

## 2024-06-03 NOTE — DISCUSSION/SUMMARY
[FreeTextEntry1] : 28-year-old woman who is seeing me for paresthesias with normal work presents with left-sided Bell's palsy.  Reassurance was given and she will be sent For facial nerve stimulation.  Patient will also obtain MRI of the brain to be as patient requested for the peace of mind.  I spent the time noted on the day of this patient encounter preparing for, review of medical records,review of pertinent diagnostic studies, providing and documenting the above E/M service and counseling and educate patient on differential, workup, disease course, and treatment/management. Education was provided to the patient during this encounter. All questions and concerns were answered and addressed in detail. The patient verbalized understanding and agreed to plan. Patient was advised to continue to monitor for neurologic symptoms and to notify my office or go to the nearest emergency room if there are any changes. Any orders/referrals and communications were provided as well. Side effects of the above medications were discussed in detail including but not limited to applicable black box warning and teratogenicity as appropriate. Patient was advised to bring previous records to my office.

## 2024-06-26 ENCOUNTER — APPOINTMENT (OUTPATIENT)
Dept: OPHTHALMOLOGY | Facility: CLINIC | Age: 29
End: 2024-06-26

## 2024-09-17 ENCOUNTER — APPOINTMENT (OUTPATIENT)
Dept: INTERNAL MEDICINE | Facility: CLINIC | Age: 29
End: 2024-09-17
Payer: COMMERCIAL

## 2024-09-17 VITALS
TEMPERATURE: 97.8 F | DIASTOLIC BLOOD PRESSURE: 90 MMHG | BODY MASS INDEX: 27.28 KG/M2 | OXYGEN SATURATION: 100 % | WEIGHT: 180 LBS | HEART RATE: 79 BPM | RESPIRATION RATE: 16 BRPM | SYSTOLIC BLOOD PRESSURE: 135 MMHG | HEIGHT: 68 IN

## 2024-09-17 VITALS — SYSTOLIC BLOOD PRESSURE: 134 MMHG | DIASTOLIC BLOOD PRESSURE: 84 MMHG

## 2024-09-17 DIAGNOSIS — D72.819 DECREASED WHITE BLOOD CELL COUNT, UNSPECIFIED: ICD-10-CM

## 2024-09-17 DIAGNOSIS — E55.9 VITAMIN D DEFICIENCY, UNSPECIFIED: ICD-10-CM

## 2024-09-17 DIAGNOSIS — Z00.00 ENCOUNTER FOR GENERAL ADULT MEDICAL EXAMINATION W/OUT ABNORMAL FINDINGS: ICD-10-CM

## 2024-09-17 DIAGNOSIS — Z12.83 ENCOUNTER FOR SCREENING FOR MALIGNANT NEOPLASM OF SKIN: ICD-10-CM

## 2024-09-17 DIAGNOSIS — R76.8 OTHER SPECIFIED ABNORMAL IMMUNOLOGICAL FINDINGS IN SERUM: ICD-10-CM

## 2024-09-17 DIAGNOSIS — Z23 ENCOUNTER FOR IMMUNIZATION: ICD-10-CM

## 2024-09-17 DIAGNOSIS — K59.00 CONSTIPATION, UNSPECIFIED: ICD-10-CM

## 2024-09-17 DIAGNOSIS — R03.0 ELEVATED BLOOD-PRESSURE READING, W/OUT DIAGNOSIS OF HYPERTENSION: ICD-10-CM

## 2024-09-17 DIAGNOSIS — Z87.898 PERSONAL HISTORY OF OTHER SPECIFIED CONDITIONS: ICD-10-CM

## 2024-09-17 DIAGNOSIS — K58.9 IRRITABLE BOWEL SYNDROME W/OUT DIARRHEA: ICD-10-CM

## 2024-09-17 DIAGNOSIS — G51.0 BELL'S PALSY: ICD-10-CM

## 2024-09-17 PROCEDURE — 90656 IIV3 VACC NO PRSV 0.5 ML IM: CPT

## 2024-09-17 PROCEDURE — G0008: CPT

## 2024-09-17 PROCEDURE — 99395 PREV VISIT EST AGE 18-39: CPT | Mod: 25

## 2024-09-17 PROCEDURE — 99214 OFFICE O/P EST MOD 30 MIN: CPT | Mod: 25

## 2024-09-23 NOTE — ED ADULT NURSE NOTE - NS ED NOTE  TALK SOMEONE YN
"Slava Elderfitz Alejandre was seen and treated in our emergency department on 9/23/2024.  She may return to work on 09/26/2024.       If you have any questions or concerns, please don't hesitate to call.      Kolby Amezcua, DO" No

## 2024-10-11 ENCOUNTER — LABORATORY RESULT (OUTPATIENT)
Age: 29
End: 2024-10-11

## 2024-10-14 ENCOUNTER — TRANSCRIPTION ENCOUNTER (OUTPATIENT)
Age: 29
End: 2024-10-14

## 2024-10-14 DIAGNOSIS — R79.89 OTHER SPECIFIED ABNORMAL FINDINGS OF BLOOD CHEMISTRY: ICD-10-CM

## 2024-10-16 ENCOUNTER — TRANSCRIPTION ENCOUNTER (OUTPATIENT)
Age: 29
End: 2024-10-16

## 2024-10-31 ENCOUNTER — APPOINTMENT (OUTPATIENT)
Dept: INTERNAL MEDICINE | Facility: CLINIC | Age: 29
End: 2024-10-31

## 2024-11-07 ENCOUNTER — APPOINTMENT (OUTPATIENT)
Dept: INTERNAL MEDICINE | Facility: CLINIC | Age: 29
End: 2024-11-07

## 2024-11-15 ENCOUNTER — LABORATORY RESULT (OUTPATIENT)
Age: 29
End: 2024-11-15

## 2024-11-18 ENCOUNTER — TRANSCRIPTION ENCOUNTER (OUTPATIENT)
Age: 29
End: 2024-11-18

## 2024-11-20 ENCOUNTER — APPOINTMENT (OUTPATIENT)
Dept: ULTRASOUND IMAGING | Facility: IMAGING CENTER | Age: 29
End: 2024-11-20
Payer: COMMERCIAL

## 2024-11-20 ENCOUNTER — OUTPATIENT (OUTPATIENT)
Dept: OUTPATIENT SERVICES | Facility: HOSPITAL | Age: 29
LOS: 1 days | End: 2024-11-20
Payer: COMMERCIAL

## 2024-11-20 DIAGNOSIS — R82.998 OTHER ABNORMAL FINDINGS IN URINE: ICD-10-CM

## 2024-11-20 DIAGNOSIS — R82.90 UNSPECIFIED ABNORMAL FINDINGS IN URINE: ICD-10-CM

## 2024-11-20 PROCEDURE — 76770 US EXAM ABDO BACK WALL COMP: CPT | Mod: 26

## 2024-11-20 PROCEDURE — 76770 US EXAM ABDO BACK WALL COMP: CPT

## 2024-11-25 ENCOUNTER — TRANSCRIPTION ENCOUNTER (OUTPATIENT)
Age: 29
End: 2024-11-25

## 2024-11-27 ENCOUNTER — TRANSCRIPTION ENCOUNTER (OUTPATIENT)
Age: 29
End: 2024-11-27

## 2024-12-05 ENCOUNTER — APPOINTMENT (OUTPATIENT)
Dept: INTERNAL MEDICINE | Facility: CLINIC | Age: 29
End: 2024-12-05
Payer: COMMERCIAL

## 2024-12-05 VITALS
BODY MASS INDEX: 27.28 KG/M2 | OXYGEN SATURATION: 99 % | DIASTOLIC BLOOD PRESSURE: 92 MMHG | HEIGHT: 68 IN | SYSTOLIC BLOOD PRESSURE: 135 MMHG | HEART RATE: 59 BPM | WEIGHT: 180 LBS | RESPIRATION RATE: 17 BRPM | TEMPERATURE: 97.8 F

## 2024-12-05 VITALS — SYSTOLIC BLOOD PRESSURE: 126 MMHG | DIASTOLIC BLOOD PRESSURE: 88 MMHG

## 2024-12-05 DIAGNOSIS — R79.89 OTHER SPECIFIED ABNORMAL FINDINGS OF BLOOD CHEMISTRY: ICD-10-CM

## 2024-12-05 DIAGNOSIS — R82.90 UNSPECIFIED ABNORMAL FINDINGS IN URINE: ICD-10-CM

## 2024-12-05 DIAGNOSIS — E55.9 VITAMIN D DEFICIENCY, UNSPECIFIED: ICD-10-CM

## 2024-12-05 DIAGNOSIS — R03.0 ELEVATED BLOOD-PRESSURE READING, W/OUT DIAGNOSIS OF HYPERTENSION: ICD-10-CM

## 2024-12-05 DIAGNOSIS — D72.819 DECREASED WHITE BLOOD CELL COUNT, UNSPECIFIED: ICD-10-CM

## 2024-12-05 PROCEDURE — G2211 COMPLEX E/M VISIT ADD ON: CPT

## 2024-12-05 PROCEDURE — 99214 OFFICE O/P EST MOD 30 MIN: CPT

## 2024-12-18 ENCOUNTER — APPOINTMENT (OUTPATIENT)
Dept: CARDIOLOGY | Facility: CLINIC | Age: 29
End: 2024-12-18

## 2025-01-14 ENCOUNTER — APPOINTMENT (OUTPATIENT)
Dept: DERMATOLOGY | Facility: CLINIC | Age: 30
End: 2025-01-14

## 2025-02-24 ENCOUNTER — APPOINTMENT (OUTPATIENT)
Dept: NEUROLOGY | Facility: CLINIC | Age: 30
End: 2025-02-24
Payer: COMMERCIAL

## 2025-02-24 VITALS
HEART RATE: 66 BPM | DIASTOLIC BLOOD PRESSURE: 93 MMHG | BODY MASS INDEX: 27.28 KG/M2 | HEIGHT: 68 IN | SYSTOLIC BLOOD PRESSURE: 147 MMHG | WEIGHT: 180 LBS | OXYGEN SATURATION: 99 %

## 2025-02-24 DIAGNOSIS — G43.709 CHRONIC MIGRAINE W/OUT AURA, NOT INTRACTABLE, W/OUT STATUS MIGRAINOSUS: ICD-10-CM

## 2025-02-24 PROCEDURE — 99214 OFFICE O/P EST MOD 30 MIN: CPT

## 2025-02-24 RX ORDER — NORTRIPTYLINE HYDROCHLORIDE 10 MG/1
10 CAPSULE ORAL
Qty: 90 | Refills: 1 | Status: ACTIVE | COMMUNITY
Start: 2025-02-24 | End: 1900-01-01

## 2025-02-24 RX ORDER — RIZATRIPTAN BENZOATE 10 MG/1
10 TABLET ORAL
Qty: 9 | Refills: 4 | Status: ACTIVE | COMMUNITY
Start: 2025-02-24 | End: 1900-01-01

## 2025-02-24 RX ORDER — NORETHINDRONE ACETATE AND ETHINYL ESTRADIOL AND FERROUS FUMARATE 1MG-20(21)
1-20 KIT ORAL
Qty: 28 | Refills: 0 | Status: ACTIVE | COMMUNITY
Start: 2025-01-29

## 2025-05-06 ENCOUNTER — RX RENEWAL (OUTPATIENT)
Age: 30
End: 2025-05-06

## 2025-05-27 ENCOUNTER — APPOINTMENT (OUTPATIENT)
Dept: NEUROLOGY | Facility: CLINIC | Age: 30
End: 2025-05-27
Payer: COMMERCIAL

## 2025-05-27 VITALS
HEIGHT: 68 IN | OXYGEN SATURATION: 100 % | BODY MASS INDEX: 27.28 KG/M2 | WEIGHT: 180 LBS | DIASTOLIC BLOOD PRESSURE: 110 MMHG | HEART RATE: 71 BPM | SYSTOLIC BLOOD PRESSURE: 165 MMHG

## 2025-05-27 DIAGNOSIS — G43.709 CHRONIC MIGRAINE W/OUT AURA, NOT INTRACTABLE, W/OUT STATUS MIGRAINOSUS: ICD-10-CM

## 2025-05-27 PROCEDURE — 99214 OFFICE O/P EST MOD 30 MIN: CPT

## 2025-05-27 RX ORDER — CANDESARTAN CILEXETIL 4 MG/1
4 TABLET ORAL
Qty: 60 | Refills: 1 | Status: ACTIVE | COMMUNITY
Start: 2025-05-27 | End: 1900-01-01

## 2025-05-27 RX ORDER — UBROGEPANT 100 MG/1
100 TABLET ORAL DAILY
Qty: 16 | Refills: 3 | Status: ACTIVE | COMMUNITY
Start: 2025-05-27 | End: 1900-01-01

## 2025-06-26 NOTE — PHYSICAL EXAM
Pt schedule with Dr silva for Er f/u today 06/26/25 at 3:20pm.    [General Appearance - Alert] : alert [Oriented To Time, Place, And Person] : oriented to person, place, and time [Person] : oriented to person [Place] : oriented to place [Time] : oriented to time [Short Term Intact] : short term memory intact [Fluency] : fluency intact [Current Events] : adequate knowledge of current events [Cranial Nerves Optic (II)] : visual acuity intact bilaterally,  visual fields full to confrontation, pupils equal round and reactive to light [Cranial Nerves Oculomotor (III)] : extraocular motion intact [Cranial Nerves Vestibulocochlear (VIII)] : hearing was intact bilaterally [Sensation Tactile Decrease] : light touch was intact [Abnormal Walk] : normal gait [1+] : Patella left 1+ [FreeTextEntry5] : Nasolabial fold decreased on the left.  Difficulty with full eye closure on the left eye

## 2025-06-27 ENCOUNTER — TRANSCRIPTION ENCOUNTER (OUTPATIENT)
Age: 30
End: 2025-06-27

## 2025-06-27 RX ORDER — GALCANEZUMAB 120 MG/ML
120 INJECTION, SOLUTION SUBCUTANEOUS
Qty: 2 | Refills: 0 | Status: ACTIVE | COMMUNITY
Start: 2025-06-27 | End: 1900-01-01

## 2025-06-27 RX ORDER — GALCANEZUMAB 120 MG/ML
120 INJECTION, SOLUTION SUBCUTANEOUS
Qty: 1 | Refills: 5 | Status: ACTIVE | COMMUNITY
Start: 2025-06-27 | End: 1900-01-01